# Patient Record
Sex: MALE | Race: WHITE | NOT HISPANIC OR LATINO | Employment: OTHER | URBAN - METROPOLITAN AREA
[De-identification: names, ages, dates, MRNs, and addresses within clinical notes are randomized per-mention and may not be internally consistent; named-entity substitution may affect disease eponyms.]

---

## 2024-04-02 ENCOUNTER — APPOINTMENT (OUTPATIENT)
Dept: RADIOLOGY | Facility: HOSPITAL | Age: 69
DRG: 070 | End: 2024-04-02
Payer: MEDICARE

## 2024-04-02 ENCOUNTER — APPOINTMENT (OUTPATIENT)
Dept: CARDIOLOGY | Facility: HOSPITAL | Age: 69
DRG: 070 | End: 2024-04-02
Payer: MEDICARE

## 2024-04-02 ENCOUNTER — HOSPITAL ENCOUNTER (INPATIENT)
Facility: HOSPITAL | Age: 69
LOS: 4 days | DRG: 070 | End: 2024-04-06
Attending: STUDENT IN AN ORGANIZED HEALTH CARE EDUCATION/TRAINING PROGRAM | Admitting: INTERNAL MEDICINE
Payer: MEDICARE

## 2024-04-02 DIAGNOSIS — R60.0 LOCALIZED EDEMA: ICD-10-CM

## 2024-04-02 DIAGNOSIS — J90 PLEURAL EFFUSION ON LEFT: ICD-10-CM

## 2024-04-02 DIAGNOSIS — R00.1 BRADYCARDIA: ICD-10-CM

## 2024-04-02 DIAGNOSIS — R53.81 DECLINING FUNCTIONAL STATUS: Primary | ICD-10-CM

## 2024-04-02 DIAGNOSIS — R41.82 ALTERED MENTAL STATUS, UNSPECIFIED ALTERED MENTAL STATUS TYPE: ICD-10-CM

## 2024-04-02 DIAGNOSIS — R22.42 LOCALIZED SWELLING, MASS AND LUMP, LEFT LOWER LIMB: ICD-10-CM

## 2024-04-02 DIAGNOSIS — R06.00 DYSPNEA, UNSPECIFIED TYPE: ICD-10-CM

## 2024-04-02 DIAGNOSIS — R60.9 EDEMA, UNSPECIFIED TYPE: ICD-10-CM

## 2024-04-02 DIAGNOSIS — E87.1 HYPONATREMIA: ICD-10-CM

## 2024-04-02 LAB
ALBUMIN SERPL BCP-MCNC: 3 G/DL (ref 3.4–5)
ALP SERPL-CCNC: 79 U/L (ref 33–136)
ALT SERPL W P-5'-P-CCNC: 12 U/L (ref 10–52)
ANION GAP SERPL CALC-SCNC: 11 MMOL/L (ref 10–20)
ANION GAP SERPL CALC-SCNC: 12 MMOL/L (ref 10–20)
ANION GAP SERPL CALC-SCNC: 14 MMOL/L (ref 10–20)
APPEARANCE UR: CLEAR
APTT PPP: 26 SECONDS (ref 27–38)
AST SERPL W P-5'-P-CCNC: 44 U/L (ref 9–39)
BASOPHILS # BLD AUTO: 0.02 X10*3/UL (ref 0–0.1)
BASOPHILS NFR BLD AUTO: 0.2 %
BILIRUB SERPL-MCNC: 0.7 MG/DL (ref 0–1.2)
BILIRUB UR STRIP.AUTO-MCNC: NEGATIVE MG/DL
BNP SERPL-MCNC: 26 PG/ML (ref 0–99)
BUN SERPL-MCNC: 24 MG/DL (ref 6–23)
CALCIUM SERPL-MCNC: 8.4 MG/DL (ref 8.6–10.3)
CALCIUM SERPL-MCNC: 8.5 MG/DL (ref 8.6–10.3)
CALCIUM SERPL-MCNC: 8.9 MG/DL (ref 8.6–10.3)
CARDIAC TROPONIN I PNL SERPL HS: 3 NG/L (ref 0–20)
CHLORIDE SERPL-SCNC: 93 MMOL/L (ref 98–107)
CHLORIDE SERPL-SCNC: 94 MMOL/L (ref 98–107)
CHLORIDE SERPL-SCNC: 94 MMOL/L (ref 98–107)
CO2 SERPL-SCNC: 28 MMOL/L (ref 21–32)
CO2 SERPL-SCNC: 30 MMOL/L (ref 21–32)
CO2 SERPL-SCNC: 30 MMOL/L (ref 21–32)
COLOR UR: ABNORMAL
CREAT SERPL-MCNC: 0.58 MG/DL (ref 0.5–1.3)
CREAT SERPL-MCNC: 0.6 MG/DL (ref 0.5–1.3)
CREAT SERPL-MCNC: 0.6 MG/DL (ref 0.5–1.3)
EGFRCR SERPLBLD CKD-EPI 2021: >90 ML/MIN/1.73M*2
EOSINOPHIL # BLD AUTO: 0.01 X10*3/UL (ref 0–0.4)
EOSINOPHIL NFR BLD AUTO: 0.1 %
ERYTHROCYTE [DISTWIDTH] IN BLOOD BY AUTOMATED COUNT: 13.3 % (ref 11.5–14.5)
GLUCOSE BLD MANUAL STRIP-MCNC: 89 MG/DL (ref 74–99)
GLUCOSE SERPL-MCNC: 97 MG/DL (ref 74–99)
GLUCOSE SERPL-MCNC: 97 MG/DL (ref 74–99)
GLUCOSE SERPL-MCNC: 99 MG/DL (ref 74–99)
GLUCOSE UR STRIP.AUTO-MCNC: NEGATIVE MG/DL
HCT VFR BLD AUTO: 38.7 % (ref 41–52)
HGB BLD-MCNC: 12.8 G/DL (ref 13.5–17.5)
HOLD SPECIMEN: NORMAL
IMM GRANULOCYTES # BLD AUTO: 0.22 X10*3/UL (ref 0–0.5)
IMM GRANULOCYTES NFR BLD AUTO: 2.5 % (ref 0–0.9)
INR PPP: 1.1 (ref 0.9–1.1)
KETONES UR STRIP.AUTO-MCNC: ABNORMAL MG/DL
LEUKOCYTE ESTERASE UR QL STRIP.AUTO: NEGATIVE
LYMPHOCYTES # BLD AUTO: 1.98 X10*3/UL (ref 0.8–3)
LYMPHOCYTES NFR BLD AUTO: 22.7 %
MCH RBC QN AUTO: 32.5 PG (ref 26–34)
MCHC RBC AUTO-ENTMCNC: 33.1 G/DL (ref 32–36)
MCV RBC AUTO: 98 FL (ref 80–100)
MONOCYTES # BLD AUTO: 1.19 X10*3/UL (ref 0.05–0.8)
MONOCYTES NFR BLD AUTO: 13.6 %
NEUTROPHILS # BLD AUTO: 5.31 X10*3/UL (ref 1.6–5.5)
NEUTROPHILS NFR BLD AUTO: 60.9 %
NITRITE UR QL STRIP.AUTO: NEGATIVE
NRBC BLD-RTO: 0 /100 WBCS (ref 0–0)
PH UR STRIP.AUTO: 5 [PH]
PLATELET # BLD AUTO: 151 X10*3/UL (ref 150–450)
POTASSIUM SERPL-SCNC: 3.6 MMOL/L (ref 3.5–5.3)
POTASSIUM SERPL-SCNC: 5.8 MMOL/L (ref 3.5–5.3)
POTASSIUM SERPL-SCNC: 5.9 MMOL/L (ref 3.5–5.3)
PROT SERPL-MCNC: 6.1 G/DL (ref 6.4–8.2)
PROT UR STRIP.AUTO-MCNC: ABNORMAL MG/DL
PROTHROMBIN TIME: 12.9 SECONDS (ref 9.8–12.8)
RBC # BLD AUTO: 3.94 X10*6/UL (ref 4.5–5.9)
RBC # UR STRIP.AUTO: NEGATIVE /UL
RBC #/AREA URNS AUTO: NORMAL /HPF
SODIUM SERPL-SCNC: 128 MMOL/L (ref 136–145)
SODIUM SERPL-SCNC: 130 MMOL/L (ref 136–145)
SODIUM SERPL-SCNC: 132 MMOL/L (ref 136–145)
SP GR UR STRIP.AUTO: 1.03
UROBILINOGEN UR STRIP.AUTO-MCNC: 4 MG/DL
WBC # BLD AUTO: 8.7 X10*3/UL (ref 4.4–11.3)
WBC #/AREA URNS AUTO: NORMAL /HPF

## 2024-04-02 PROCEDURE — 82607 VITAMIN B-12: CPT | Mod: STJLAB

## 2024-04-02 PROCEDURE — 1200000002 HC GENERAL ROOM WITH TELEMETRY DAILY

## 2024-04-02 PROCEDURE — 70450 CT HEAD/BRAIN W/O DYE: CPT | Performed by: RADIOLOGY

## 2024-04-02 PROCEDURE — 80048 BASIC METABOLIC PNL TOTAL CA: CPT | Mod: CCI

## 2024-04-02 PROCEDURE — 93010 ELECTROCARDIOGRAM REPORT: CPT | Performed by: STUDENT IN AN ORGANIZED HEALTH CARE EDUCATION/TRAINING PROGRAM

## 2024-04-02 PROCEDURE — 71045 X-RAY EXAM CHEST 1 VIEW: CPT

## 2024-04-02 PROCEDURE — 92950 HEART/LUNG RESUSCITATION CPR: CPT | Performed by: STUDENT IN AN ORGANIZED HEALTH CARE EDUCATION/TRAINING PROGRAM

## 2024-04-02 PROCEDURE — 84484 ASSAY OF TROPONIN QUANT: CPT | Performed by: STUDENT IN AN ORGANIZED HEALTH CARE EDUCATION/TRAINING PROGRAM

## 2024-04-02 PROCEDURE — 36415 COLL VENOUS BLD VENIPUNCTURE: CPT

## 2024-04-02 PROCEDURE — 82947 ASSAY GLUCOSE BLOOD QUANT: CPT

## 2024-04-02 PROCEDURE — 80053 COMPREHEN METABOLIC PANEL: CPT | Performed by: STUDENT IN AN ORGANIZED HEALTH CARE EDUCATION/TRAINING PROGRAM

## 2024-04-02 PROCEDURE — 81001 URINALYSIS AUTO W/SCOPE: CPT

## 2024-04-02 PROCEDURE — 93005 ELECTROCARDIOGRAM TRACING: CPT

## 2024-04-02 PROCEDURE — 2500000002 HC RX 250 W HCPCS SELF ADMINISTERED DRUGS (ALT 637 FOR MEDICARE OP, ALT 636 FOR OP/ED): Mod: MUE | Performed by: NURSE PRACTITIONER

## 2024-04-02 PROCEDURE — 85730 THROMBOPLASTIN TIME PARTIAL: CPT | Performed by: STUDENT IN AN ORGANIZED HEALTH CARE EDUCATION/TRAINING PROGRAM

## 2024-04-02 PROCEDURE — 99285 EMERGENCY DEPT VISIT HI MDM: CPT | Mod: 25

## 2024-04-02 PROCEDURE — 85610 PROTHROMBIN TIME: CPT | Performed by: STUDENT IN AN ORGANIZED HEALTH CARE EDUCATION/TRAINING PROGRAM

## 2024-04-02 PROCEDURE — 2500000004 HC RX 250 GENERAL PHARMACY W/ HCPCS (ALT 636 FOR OP/ED): Performed by: NURSE PRACTITIONER

## 2024-04-02 PROCEDURE — 36415 COLL VENOUS BLD VENIPUNCTURE: CPT | Performed by: STUDENT IN AN ORGANIZED HEALTH CARE EDUCATION/TRAINING PROGRAM

## 2024-04-02 PROCEDURE — 70450 CT HEAD/BRAIN W/O DYE: CPT

## 2024-04-02 PROCEDURE — 99285 EMERGENCY DEPT VISIT HI MDM: CPT | Performed by: STUDENT IN AN ORGANIZED HEALTH CARE EDUCATION/TRAINING PROGRAM

## 2024-04-02 PROCEDURE — 83880 ASSAY OF NATRIURETIC PEPTIDE: CPT | Performed by: STUDENT IN AN ORGANIZED HEALTH CARE EDUCATION/TRAINING PROGRAM

## 2024-04-02 PROCEDURE — 80048 BASIC METABOLIC PNL TOTAL CA: CPT | Mod: CCI | Performed by: STUDENT IN AN ORGANIZED HEALTH CARE EDUCATION/TRAINING PROGRAM

## 2024-04-02 PROCEDURE — 85025 COMPLETE CBC W/AUTO DIFF WBC: CPT | Performed by: STUDENT IN AN ORGANIZED HEALTH CARE EDUCATION/TRAINING PROGRAM

## 2024-04-02 PROCEDURE — 71045 X-RAY EXAM CHEST 1 VIEW: CPT | Performed by: RADIOLOGY

## 2024-04-02 PROCEDURE — 2500000001 HC RX 250 WO HCPCS SELF ADMINISTERED DRUGS (ALT 637 FOR MEDICARE OP): Performed by: NURSE PRACTITIONER

## 2024-04-02 RX ORDER — LATANOPROST 50 UG/ML
1 SOLUTION/ DROPS OPHTHALMIC NIGHTLY
COMMUNITY

## 2024-04-02 RX ORDER — CHOLECALCIFEROL (VITAMIN D3) 50 MCG
50 TABLET ORAL NIGHTLY
COMMUNITY

## 2024-04-02 RX ORDER — LATANOPROST 50 UG/ML
1 SOLUTION/ DROPS OPHTHALMIC NIGHTLY
Status: DISCONTINUED | OUTPATIENT
Start: 2024-04-02 | End: 2024-04-06 | Stop reason: HOSPADM

## 2024-04-02 RX ORDER — NAPROXEN SODIUM 220 MG/1
81 TABLET, FILM COATED ORAL DAILY
COMMUNITY

## 2024-04-02 RX ORDER — DIVALPROEX SODIUM 250 MG/1
250 TABLET, DELAYED RELEASE ORAL NIGHTLY
COMMUNITY

## 2024-04-02 RX ORDER — TAMSULOSIN HYDROCHLORIDE 0.4 MG/1
0.4 CAPSULE ORAL NIGHTLY
COMMUNITY

## 2024-04-02 RX ORDER — ACETAMINOPHEN 325 MG/1
650 TABLET ORAL EVERY 6 HOURS PRN
Status: DISCONTINUED | OUTPATIENT
Start: 2024-04-02 | End: 2024-04-06 | Stop reason: HOSPADM

## 2024-04-02 RX ORDER — SIMVASTATIN 20 MG/1
20 TABLET, FILM COATED ORAL NIGHTLY
Status: DISCONTINUED | OUTPATIENT
Start: 2024-04-02 | End: 2024-04-06 | Stop reason: HOSPADM

## 2024-04-02 RX ORDER — ACETAMINOPHEN 325 MG/1
650 TABLET ORAL EVERY 6 HOURS PRN
COMMUNITY

## 2024-04-02 RX ORDER — MAGNESIUM HYDROXIDE 2400 MG/10ML
30 SUSPENSION ORAL DAILY PRN
Status: DISCONTINUED | OUTPATIENT
Start: 2024-04-02 | End: 2024-04-06 | Stop reason: HOSPADM

## 2024-04-02 RX ORDER — DIVALPROEX SODIUM 500 MG/1
2000 TABLET, DELAYED RELEASE ORAL NIGHTLY
COMMUNITY

## 2024-04-02 RX ORDER — TALC
3 POWDER (GRAM) TOPICAL NIGHTLY PRN
Status: DISCONTINUED | OUTPATIENT
Start: 2024-04-02 | End: 2024-04-06 | Stop reason: HOSPADM

## 2024-04-02 RX ORDER — GLUCOSAM/CHONDRO/HERB 149/HYAL 750-100 MG
1 TABLET ORAL NIGHTLY
COMMUNITY

## 2024-04-02 RX ORDER — LEVOTHYROXINE SODIUM 50 UG/1
50 TABLET ORAL
COMMUNITY

## 2024-04-02 RX ORDER — DIVALPROEX SODIUM 250 MG/1
250 TABLET, DELAYED RELEASE ORAL NIGHTLY
Status: DISCONTINUED | OUTPATIENT
Start: 2024-04-02 | End: 2024-04-06 | Stop reason: HOSPADM

## 2024-04-02 RX ORDER — SIMVASTATIN 20 MG/1
20 TABLET, FILM COATED ORAL NIGHTLY
COMMUNITY

## 2024-04-02 RX ORDER — DIVALPROEX SODIUM 500 MG/1
2000 TABLET, DELAYED RELEASE ORAL NIGHTLY
Status: DISCONTINUED | OUTPATIENT
Start: 2024-04-02 | End: 2024-04-06 | Stop reason: HOSPADM

## 2024-04-02 RX ORDER — TAMSULOSIN HYDROCHLORIDE 0.4 MG/1
0.4 CAPSULE ORAL NIGHTLY
Status: DISCONTINUED | OUTPATIENT
Start: 2024-04-02 | End: 2024-04-06 | Stop reason: HOSPADM

## 2024-04-02 RX ORDER — POLYETHYLENE GLYCOL 3350 17 G/17G
17 POWDER, FOR SOLUTION ORAL DAILY PRN
Status: DISCONTINUED | OUTPATIENT
Start: 2024-04-02 | End: 2024-04-06 | Stop reason: HOSPADM

## 2024-04-02 RX ORDER — ADHESIVE BANDAGE
30 BANDAGE TOPICAL DAILY PRN
COMMUNITY

## 2024-04-02 RX ORDER — KETOCONAZOLE 20 MG/G
1 CREAM TOPICAL 2 TIMES DAILY PRN
COMMUNITY

## 2024-04-02 RX ORDER — ENOXAPARIN SODIUM 100 MG/ML
40 INJECTION SUBCUTANEOUS DAILY
Status: DISCONTINUED | OUTPATIENT
Start: 2024-04-02 | End: 2024-04-06 | Stop reason: HOSPADM

## 2024-04-02 RX ORDER — LEVOTHYROXINE SODIUM 50 UG/1
50 TABLET ORAL
Status: DISCONTINUED | OUTPATIENT
Start: 2024-04-03 | End: 2024-04-06 | Stop reason: HOSPADM

## 2024-04-02 RX ORDER — NAPROXEN SODIUM 220 MG/1
81 TABLET, FILM COATED ORAL DAILY
Status: DISCONTINUED | OUTPATIENT
Start: 2024-04-02 | End: 2024-04-06 | Stop reason: HOSPADM

## 2024-04-02 RX ADMIN — DIVALPROEX SODIUM 250 MG: 500 TABLET, DELAYED RELEASE ORAL at 20:44

## 2024-04-02 RX ADMIN — ASPIRIN 81 MG 81 MG: 81 TABLET ORAL at 20:48

## 2024-04-02 RX ADMIN — SIMVASTATIN 20 MG: 20 TABLET, FILM COATED ORAL at 20:45

## 2024-04-02 RX ADMIN — TAMSULOSIN HYDROCHLORIDE 0.4 MG: 0.4 CAPSULE ORAL at 20:45

## 2024-04-02 RX ADMIN — LATANOPROST 1 DROP: 50 SOLUTION OPHTHALMIC at 20:48

## 2024-04-02 RX ADMIN — ENOXAPARIN SODIUM 40 MG: 40 INJECTION SUBCUTANEOUS at 17:54

## 2024-04-02 RX ADMIN — DIVALPROEX SODIUM 2000 MG: 500 TABLET, DELAYED RELEASE ORAL at 21:00

## 2024-04-02 SDOH — HEALTH STABILITY: MENTAL HEALTH: HOW OFTEN DO YOU HAVE 6 OR MORE DRINKS ON ONE OCCASION?: NEVER

## 2024-04-02 SDOH — SOCIAL STABILITY: SOCIAL INSECURITY: WERE YOU ABLE TO COMPLETE ALL THE BEHAVIORAL HEALTH SCREENINGS?: YES

## 2024-04-02 SDOH — HEALTH STABILITY: MENTAL HEALTH: HOW OFTEN DO YOU HAVE A DRINK CONTAINING ALCOHOL?: NEVER

## 2024-04-02 SDOH — SOCIAL STABILITY: SOCIAL INSECURITY: ARE THERE ANY APPARENT SIGNS OF INJURIES/BEHAVIORS THAT COULD BE RELATED TO ABUSE/NEGLECT?: UNABLE TO ASSESS

## 2024-04-02 SDOH — SOCIAL STABILITY: SOCIAL INSECURITY: HAS ANYONE EVER THREATENED TO HURT YOUR FAMILY OR YOUR PETS?: UNABLE TO ASSESS

## 2024-04-02 SDOH — SOCIAL STABILITY: SOCIAL INSECURITY: ABUSE: ADULT

## 2024-04-02 SDOH — HEALTH STABILITY: MENTAL HEALTH: HOW MANY STANDARD DRINKS CONTAINING ALCOHOL DO YOU HAVE ON A TYPICAL DAY?: PATIENT DOES NOT DRINK

## 2024-04-02 SDOH — SOCIAL STABILITY: SOCIAL INSECURITY: ARE YOU OR HAVE YOU BEEN THREATENED OR ABUSED PHYSICALLY, EMOTIONALLY, OR SEXUALLY BY ANYONE?: UNABLE TO ASSESS

## 2024-04-02 SDOH — SOCIAL STABILITY: SOCIAL INSECURITY: DO YOU FEEL UNSAFE GOING BACK TO THE PLACE WHERE YOU ARE LIVING?: UNABLE TO ASSESS

## 2024-04-02 SDOH — SOCIAL STABILITY: SOCIAL INSECURITY: DOES ANYONE TRY TO KEEP YOU FROM HAVING/CONTACTING OTHER FRIENDS OR DOING THINGS OUTSIDE YOUR HOME?: UNABLE TO ASSESS

## 2024-04-02 SDOH — SOCIAL STABILITY: SOCIAL INSECURITY: DO YOU FEEL ANYONE HAS EXPLOITED OR TAKEN ADVANTAGE OF YOU FINANCIALLY OR OF YOUR PERSONAL PROPERTY?: UNABLE TO ASSESS

## 2024-04-02 SDOH — SOCIAL STABILITY: SOCIAL INSECURITY: HAVE YOU HAD THOUGHTS OF HARMING ANYONE ELSE?: NO

## 2024-04-02 ASSESSMENT — COGNITIVE AND FUNCTIONAL STATUS - GENERAL
PERSONAL GROOMING: A LITTLE
MOVING TO AND FROM BED TO CHAIR: A LOT
CLIMB 3 TO 5 STEPS WITH RAILING: TOTAL
DAILY ACTIVITIY SCORE: 14
DRESSING REGULAR LOWER BODY CLOTHING: A LOT
STANDING UP FROM CHAIR USING ARMS: A LOT
CLIMB 3 TO 5 STEPS WITH RAILING: TOTAL
TOILETING: A LOT
HELP NEEDED FOR BATHING: A LOT
HELP NEEDED FOR BATHING: A LOT
WALKING IN HOSPITAL ROOM: TOTAL
MOVING FROM LYING ON BACK TO SITTING ON SIDE OF FLAT BED WITH BEDRAILS: A LOT
EATING MEALS: A LITTLE
DRESSING REGULAR LOWER BODY CLOTHING: A LOT
PERSONAL GROOMING: A LOT
DRESSING REGULAR UPPER BODY CLOTHING: A LOT
DRESSING REGULAR UPPER BODY CLOTHING: A LOT
MOVING FROM LYING ON BACK TO SITTING ON SIDE OF FLAT BED WITH BEDRAILS: A LOT
STANDING UP FROM CHAIR USING ARMS: A LOT
PATIENT BASELINE BEDBOUND: UNABLE TO ASSESS AT THIS TIME
MOBILITY SCORE: 10
TURNING FROM BACK TO SIDE WHILE IN FLAT BAD: A LOT
DAILY ACTIVITIY SCORE: 12
WALKING IN HOSPITAL ROOM: TOTAL
MOBILITY SCORE: 10
TURNING FROM BACK TO SIDE WHILE IN FLAT BAD: A LOT
TOILETING: A LOT
MOVING TO AND FROM BED TO CHAIR: A LOT
EATING MEALS: A LOT

## 2024-04-02 ASSESSMENT — ACTIVITIES OF DAILY LIVING (ADL)
FEEDING YOURSELF: NEEDS ASSISTANCE
DRESSING YOURSELF: DEPENDENT
GROOMING: DEPENDENT
WALKS IN HOME: DEPENDENT
HEARING - LEFT EAR: FUNCTIONAL
HEARING - RIGHT EAR: FUNCTIONAL
ADEQUATE_TO_COMPLETE_ADL: NO
TOILETING: DEPENDENT
LACK_OF_TRANSPORTATION: PATIENT UNABLE TO ANSWER
BATHING: DEPENDENT
PATIENT'S MEMORY ADEQUATE TO SAFELY COMPLETE DAILY ACTIVITIES?: NO
JUDGMENT_ADEQUATE_SAFELY_COMPLETE_DAILY_ACTIVITIES: NO

## 2024-04-02 ASSESSMENT — PATIENT HEALTH QUESTIONNAIRE - PHQ9
2. FEELING DOWN, DEPRESSED OR HOPELESS: NOT AT ALL
1. LITTLE INTEREST OR PLEASURE IN DOING THINGS: NOT AT ALL
SUM OF ALL RESPONSES TO PHQ9 QUESTIONS 1 & 2: 0

## 2024-04-02 ASSESSMENT — LIFESTYLE VARIABLES
HOW MANY STANDARD DRINKS CONTAINING ALCOHOL DO YOU HAVE ON A TYPICAL DAY: PATIENT DOES NOT DRINK
HAVE PEOPLE ANNOYED YOU BY CRITICIZING YOUR DRINKING: NO
TOTAL SCORE: 0
HOW OFTEN DO YOU HAVE A DRINK CONTAINING ALCOHOL: NEVER
EVER HAD A DRINK FIRST THING IN THE MORNING TO STEADY YOUR NERVES TO GET RID OF A HANGOVER: NO
AUDIT-C TOTAL SCORE: 0
AUDIT-C TOTAL SCORE: 0
SKIP TO QUESTIONS 9-10: 1
HOW OFTEN DO YOU HAVE 6 OR MORE DRINKS ON ONE OCCASION: NEVER
HAVE YOU EVER FELT YOU SHOULD CUT DOWN ON YOUR DRINKING: NO
SKIP TO QUESTIONS 9-10: 1
AUDIT-C TOTAL SCORE: 0
AUDIT-C TOTAL SCORE: 0
EVER FELT BAD OR GUILTY ABOUT YOUR DRINKING: NO
SKIP TO QUESTIONS 9-10: 1

## 2024-04-02 ASSESSMENT — COLUMBIA-SUICIDE SEVERITY RATING SCALE - C-SSRS
6. HAVE YOU EVER DONE ANYTHING, STARTED TO DO ANYTHING, OR PREPARED TO DO ANYTHING TO END YOUR LIFE?: NO
1. IN THE PAST MONTH, HAVE YOU WISHED YOU WERE DEAD OR WISHED YOU COULD GO TO SLEEP AND NOT WAKE UP?: NO
2. HAVE YOU ACTUALLY HAD ANY THOUGHTS OF KILLING YOURSELF?: NO

## 2024-04-02 ASSESSMENT — PAIN - FUNCTIONAL ASSESSMENT: PAIN_FUNCTIONAL_ASSESSMENT: 0-10

## 2024-04-02 ASSESSMENT — PAIN SCALES - GENERAL
PAINLEVEL_OUTOF10: 0 - NO PAIN
PAINLEVEL_OUTOF10: 0 - NO PAIN

## 2024-04-02 NOTE — ED PROVIDER NOTES
EMERGENCY DEPARTMENT ENCOUNTER      Pt Name: Dimas Ann  MRN: [id number]  Birthdate 1955  Date of evaluation: 4/2/2024  Provider: Julian Finch DO    CHIEF COMPLAINT       Chief Complaint   Patient presents with    Altered Mental Status         HISTORY OF PRESENT ILLNESS    HPI  Patient is a 69-year-old male with history of CVA with right-sided hemiparesis presenting with possible altered mental status.  This has been ongoing for 3 days.  His nursing home was unable to tell EMS what his baseline was and knew very little information about him; stating that he hadn't lived there very long.  At presentation, patient is minimally conversive, A&O x 0, following commands on his left side only.    Nursing Notes were reviewed.    PAST MEDICAL HISTORY     Past Medical History:   Diagnosis Date    Aphasia due to acute stroke (CMS/HCC)     Cellulitis     Hemiplegia (CMS/HCC)     Stroke (CMS/HCC)          SURGICAL HISTORY     History reviewed. No pertinent surgical history.      CURRENT MEDICATIONS       Current Discharge Medication List        CONTINUE these medications which have NOT CHANGED    Details   acetaminophen (Tylenol) 325 mg tablet Take 2 tablets (650 mg) by mouth every 6 hours if needed for mild pain (1 - 3) or fever (temp greater than 38.0 C).      aspirin 81 mg chewable tablet Chew 1 tablet (81 mg) once daily.      cholecalciferol (Vitamin D-3) 50 MCG (2000 UT) tablet Take 1 tablet (50 mcg) by mouth once daily at bedtime.      !! divalproex (Depakote) 250 mg EC tablet Take 1 tablet (250 mg) by mouth once daily at bedtime. Total of 2,250mg      !! divalproex (Depakote) 500 mg EC tablet Take 4 tablets (2,000 mg) by mouth once daily at bedtime. Total of 2,250mg      ketoconazole (NIZOral) 2 % cream Apply 1 Application topically 2 times a day as needed for itching or rash.      latanoprost (Xalatan) 0.005 % ophthalmic solution Administer 1 drop into both eyes once daily at bedtime.      levothyroxine  (Synthroid, Levoxyl) 50 mcg tablet Take 1 tablet (50 mcg) by mouth once daily in the morning. Take before meals.      magnesium hydroxide (Milk of Magnesia) 400 mg/5 mL suspension Take 30 mL by mouth once daily as needed for constipation.      omega 3-dha-epa-fish oil (Fish OiL) 1,000 mg (120 mg-180 mg) capsule Take 1 capsule (1,000 mg) by mouth once daily at bedtime.      simvastatin (Zocor) 20 mg tablet Take 1 tablet (20 mg) by mouth once daily at bedtime.      tamsulosin (Flomax) 0.4 mg 24 hr capsule Take 1 capsule (0.4 mg) by mouth once daily at bedtime.       !! - Potential duplicate medications found. Please discuss with provider.          ALLERGIES     Patient has no known allergies.    FAMILY HISTORY     No family history on file.       SOCIAL HISTORY       Social History     Socioeconomic History    Marital status: Single     Spouse name: None    Number of children: None    Years of education: None    Highest education level: None   Occupational History    None   Tobacco Use    Smoking status: Unknown    Smokeless tobacco: None   Substance and Sexual Activity    Alcohol use: Not Currently    Drug use: Defer    Sexual activity: None   Other Topics Concern    None   Social History Narrative    None     Social Determinants of Health     Financial Resource Strain: Patient Unable To Answer (4/2/2024)    Overall Financial Resource Strain (CARDIA)     Difficulty of Paying Living Expenses: Patient unable to answer   Food Insecurity: Not on file   Transportation Needs: Patient Unable To Answer (4/2/2024)    PRAPARE - Transportation     Lack of Transportation (Medical): Patient unable to answer     Lack of Transportation (Non-Medical): Patient unable to answer   Physical Activity: Not on file   Stress: Not on file   Social Connections: Not on file   Intimate Partner Violence: Not on file   Housing Stability: Patient Unable To Answer (4/2/2024)    Housing Stability Vital Sign     Unable to Pay for Housing in the Last  Year: Patient unable to answer     Number of Places Lived in the Last Year: 1     Unstable Housing in the Last Year: Patient unable to answer       SCREENINGS                        PHYSICAL EXAM    (up to 7 for level 4, 8 or more for level 5)     ED Triage Vitals [04/02/24 0833]   Temperature Heart Rate Respirations BP   36.2 °C (97.2 °F) 85 20 127/70      Pulse Ox Temp Source Heart Rate Source Patient Position   94 % Temporal Monitor --      BP Location FiO2 (%)     Right arm --       Physical Exam  Vitals and nursing note reviewed.   Constitutional:       General: He is not in acute distress.     Appearance: He is not toxic-appearing.   HENT:      Head: Normocephalic and atraumatic.   Eyes:      General: No scleral icterus.     Extraocular Movements: Extraocular movements intact.      Pupils: Pupils are equal, round, and reactive to light.   Cardiovascular:      Rate and Rhythm: Normal rate and regular rhythm.      Heart sounds: Normal heart sounds.   Pulmonary:      Effort: Pulmonary effort is normal. No respiratory distress.      Breath sounds: Normal breath sounds.   Abdominal:      General: There is no distension.      Palpations: Abdomen is soft.      Tenderness: There is no abdominal tenderness.   Musculoskeletal:         General: No tenderness. Normal range of motion.      Cervical back: Normal range of motion and neck supple.   Skin:     General: Skin is warm and dry.      Comments: Chronic skin changes to the bilateral lower extremity to the knee   Neurological:      Comments: Alert and oriented x 0, following commands with the left-sided extremities only.  Fails to further participate in neurologic exam due to mentation.          DIAGNOSTIC RESULTS     LABS:  Labs Reviewed   CBC WITH AUTO DIFFERENTIAL - Abnormal       Result Value    WBC 8.7      nRBC 0.0      RBC 3.94 (*)     Hemoglobin 12.8 (*)     Hematocrit 38.7 (*)     MCV 98      MCH 32.5      MCHC 33.1      RDW 13.3      Platelets 151       Neutrophils % 60.9      Immature Granulocytes %, Automated 2.5 (*)     Lymphocytes % 22.7      Monocytes % 13.6      Eosinophils % 0.1      Basophils % 0.2      Neutrophils Absolute 5.31      Immature Granulocytes Absolute, Automated 0.22      Lymphocytes Absolute 1.98      Monocytes Absolute 1.19 (*)     Eosinophils Absolute 0.01      Basophils Absolute 0.02     COMPREHENSIVE METABOLIC PANEL - Abnormal    Glucose 97      Sodium 128 (*)     Potassium 5.8 (*)     Chloride 93 (*)     Bicarbonate 30      Anion Gap 11      Urea Nitrogen 24 (*)     Creatinine 0.60      eGFR >90      Calcium 8.4 (*)     Albumin 3.0 (*)     Alkaline Phosphatase 79      Total Protein 6.1 (*)     AST 44 (*)     Bilirubin, Total 0.7      ALT 12     PROTIME-INR - Abnormal    Protime 12.9 (*)     INR 1.1     APTT - Abnormal    aPTT 26 (*)     Narrative:     The APTT is no longer used for monitoring Unfractionated Heparin Therapy. For monitoring Heparin Therapy, use the Heparin Assay.   URINALYSIS WITH REFLEX CULTURE AND MICROSCOPIC - Abnormal    Color, Urine Karlene (*)     Appearance, Urine Clear      Specific Gravity, Urine 1.033      pH, Urine 5.0      Protein, Urine 100 (2+) (*)     Glucose, Urine NEGATIVE      Blood, Urine NEGATIVE      Ketones, Urine 5 (TRACE) (*)     Bilirubin, Urine NEGATIVE      Urobilinogen, Urine 4.0 (*)     Nitrite, Urine NEGATIVE      Leukocyte Esterase, Urine NEGATIVE     BASIC METABOLIC PANEL - Abnormal    Glucose 97      Sodium 130 (*)     Potassium 5.9 (*)     Chloride 94 (*)     Bicarbonate 28      Anion Gap 14      Urea Nitrogen 24 (*)     Creatinine 0.58      eGFR >90      Calcium 8.5 (*)    BASIC METABOLIC PANEL - Abnormal    Glucose 99      Sodium 132 (*)     Potassium 3.6      Chloride 94 (*)     Bicarbonate 30      Anion Gap 12      Urea Nitrogen 24 (*)     Creatinine 0.60      eGFR >90      Calcium 8.9     TROPONIN I, HIGH SENSITIVITY - Normal    Troponin I, High Sensitivity 3      Narrative:     Less  than 99th percentile of normal range cutoff-  Female and children under 18 years old <14 ng/L; Male <21 ng/L: Negative  Repeat testing should be performed if clinically indicated.     Female and children under 18 years old 14-50 ng/L; Male 21-50 ng/L:  Consistent with possible cardiac damage and possible increased clinical   risk. Serial measurements may help to assess extent of myocardial damage.     >50 ng/L: Consistent with cardiac damage, increased clinical risk and  myocardial infarction. Serial measurements may help assess extent of   myocardial damage.      NOTE: Children less than 1 year old may have higher baseline troponin   levels and results should be interpreted in conjunction with the overall   clinical context.     NOTE: Troponin I testing is performed using a different   testing methodology at [facility] than at other   [facility]. Direct result comparisons should only   be made within the same method.   B-TYPE NATRIURETIC PEPTIDE - Normal    BNP 26      Narrative:        <100 pg/mL - Heart failure unlikely  100-299 pg/mL - Intermediate probability of acute heart                  failure exacerbation. Correlate with clinical                  context and patient history.    >=300 pg/mL - Heart Failure likely. Correlate with clinical                  context and patient history.    BNP testing is performed using different testing methodology at [facility] than at other [facility]. Direct result comparisons should only be made within the same method.      POCT GLUCOSE - Normal    POCT Glucose 89     URINALYSIS MICROSCOPIC WITH REFLEX CULTURE - Normal    WBC, Urine 1-5      RBC, Urine 3-5     URINALYSIS WITH REFLEX CULTURE AND MICROSCOPIC    Narrative:     The following orders were created for panel order Urinalysis with Reflex Culture and Microscopic.  Procedure                               Abnormality         Status                     ---------                                -----------         ------                     Urinalysis with Reflex C...[[id number]]  Abnormal            Final result               Extra Urine Gray Tube[[id number]]                            Final result                 Please view results for these tests on the individual orders.   EXTRA URINE GRAY TUBE    Extra Tube Hold for add-ons.     CBC   BASIC METABOLIC PANEL   PHOSPHORUS   MAGNESIUM   HEPATIC FUNCTION PANEL   AMMONIA   POCT GLUCOSE METER       All other labs were within normal range or not returned as of this dictation.    Imaging  XR chest 1 view   Final Result   Findings consistent with ongoing CH F.        Moderate-sized left pleural effusion with associated left basilar   atelectasis.        MACRO:   None        Signed by: Jayce Landeros 4/2/2024 10:29 AM   Dictation workstation:   [id number]      CT head wo IV contrast   Final Result   No acute intracranial bleed or focal mass effect.        Changes from large old infarct in the left MCA distribution.        Moderate volume loss.        Mild chronic white matter ischemic disease in the deep   periventricular regions.        High left paramidline parietal scalp calcified nodule. Old calcified   hematoma versus calcified sebaceous cyst.        MACRO:   None        Signed by: Jayce Landeros 4/2/2024 9:38 AM   Dictation workstation:   [id number]           Procedures  Procedures     EMERGENCY DEPARTMENT COURSE/MDM:     ED Course as of 04/03/24 0040   Tue Apr 02, 2024   0847 EKG independently interpreted by attending physician    0841 hrs.: Normal sinus rhythm ventricular rate of 87 bpm.  QTc 421.  QRS 92.  Nonspecific T wave abnormality.  No acute injury pattern seen. [AI]      ED Course User Index  [AI] Julian Finch DO         Diagnoses as of 04/03/24 0040   Declining functional status   Hyponatremia   Altered mental status, unspecified altered mental status type        Medical Decision Making  History obtained from EMS and later family.   Records including labs, imaging, notes reviewed.  Presentation, patient's baseline was unknown.  Patient was reportedly only at this particular assisted care facility for short period of time and no one was too familiar with him on staff.  The being said, patient has had similar neurologic symptoms for several days; a brain attack was not called.  Cardiac labs, urinalysis, and head CT were then ordered to evaluate for possible causes of altered mental status, generalized weakness.  CMP demonstrated mildly elevated BUN of 24, mild hyponatremia of 128, hemolyzed potassium of 5.8.  Repeat BMP was also hemolyzed.  A third BMP was ordered which demonstrated normal potassium of 3.6.  Hematology notable for minor anemia of 12.8.  We had no values to compare this to his [place].  Urinalysis with no evidence of UTI or other acute pathology.  Chest x-ray unremarkable.  Troponin and BNP within normal limits.  Head CT without evidence of acute findings.  After his workup was complete, patient's brother and sister-in-law had arrived.  It was found out that he began to become altered around October 2023 when he was living down [facility] and his brother moved him to [place].  He was still using his walker on his own during that time however had a decline in February 2024.  Since that time, patient has had needed more assistance with daily activities.  He is currently in an assisted living facility.       He was admitted to this assisted care facility via a telemedicine evaluation however at this time believe that he would benefit from skilled nursing facility.  He was requiring 3 person assist for transfers which was not appropriate for assisted care.  This was discussed with family and given these concerns, decision made to connect with social work and attempt to place patient if possible.  Attending physician spoke to Lynn and she stated that patient would need precert.  Given his  generalized weakness, hyponatremia, altered mental status, case was discussed with admitting doctor for admission.      EKG demonstrated normal sinus rhythm at a rate of 87, QTc of 421.  No acute injury pattern.    Patient and or family in agreement and understanding of treatment plan.  All questions answered.      I reviewed the case with the attending ED physician. The attending ED physician agrees with the plan. Patient and/or patient´s representative was counseled regarding labs, imaging, likely diagnosis, and plan. All questions were answered.    ED Medications administered this visit:    Medications   enoxaparin (Lovenox) syringe 40 mg (40 mg subcutaneous Given 4/2/24 1754)   melatonin tablet 3 mg (has no administration in time range)   polyethylene glycol (Glycolax, Miralax) packet 17 g (has no administration in time range)   acetaminophen (Tylenol) tablet 650 mg (has no administration in time range)   aspirin chewable tablet 81 mg (81 mg oral Given 4/2/24 2048)   divalproex (Depakote) EC tablet 250 mg (250 mg oral Given 4/2/24 2044)   divalproex (Depakote) EC tablet 2,000 mg (2,000 mg oral Given 4/2/24 2100)   latanoprost (Xalatan) 0.005 % ophthalmic solution 1 drop (1 drop Both Eyes Given 4/2/24 2048)   levothyroxine (Synthroid, Levoxyl) tablet 50 mcg (has no administration in time range)   magnesium hydroxide (Milk of Magnesia) 2,400 mg/10 mL suspension 30 mL (has no administration in time range)   simvastatin (Zocor) tablet 20 mg (20 mg oral Given 4/2/24 2045)   tamsulosin (Flomax) 24 hr capsule 0.4 mg (0.4 mg oral Given 4/2/24 2045)       New Prescriptions from this visit:    Current Discharge Medication List          Follow-up:  Avila Weller MD  [address]  [place] [address]  [phone]              Final Impression:   1. Declining functional status    2. Hyponatremia    3. Altered mental status, unspecified altered mental status type          (Please note that portions  of this note were completed with a voice recognition program.  Efforts were made to edit the dictations but occasionally words are mis-transcribed.)     Luis Coelho MD  Resident  04/02/24 0318    The patient was seen by the resident/fellow.  I have personally performed a substantive portion of the encounter.  I have seen and examined the patient; agree with the workup, evaluation, MDM, management and diagnosis.  The care plan has been discussed with the resident; I have reviewed the resident’s note and agree with the documented findings.           Julian Finch DO  04/03/24 0040

## 2024-04-02 NOTE — CARE PLAN
The clinical goals for the shift include remain free from falls this shift      Problem: Pain  Goal: My pain/discomfort is manageable  Outcome: Progressing  Flowsheets (Taken 4/2/2024 1838)  Resident's pain/discomfort is manageable:   Include resident/family/caregiver in decisions related to pain management   Offer non-pharmacological pain management interventions   Administer pain medication prior to activities that may trigger pain   Identify and avoid pain triggers     Problem: Safety  Goal: Patient will be injury free during hospitalization  Outcome: Progressing  Goal: I will remain free of falls  Outcome: Progressing  Flowsheets (Taken 4/2/2024 1838)  Resident will remain free of falls:   Use gait belt for all transfers   Apply bed/chair alarms as appropriate   Accompany resident as ordered (ex. 1:1, stand-by assist, dayroom monitoring, 15 minute checks, line of sight)   Assist with toileting as orderd   Visual checks per facility policy   Maintain bed at position as ordered (chair height, low bed)   Consult with physical therapy as needed   Assess and monitor medications that may increase fall risk   Consider transfer to room close to nurses' station     Problem: Daily Care  Goal: Daily care needs are met  Outcome: Progressing  Flowsheets (Taken 4/2/2024 1838)  Daily care needs are met:   Assess and monitor ability to perform self care and identify potential discharge needs   Assess skin integrity/risk for skin breakdown   Assist patient with activities of daily living as needed   Encourage independent activity per ability   Provide mouth care   Include patient/family/caregiver in decisions related to daily care     Problem: Psychosocial Needs  Goal: Demonstrates ability to cope with hospitalization/illness  Outcome: Progressing  Flowsheets (Taken 4/2/2024 1838)  Demonstrates ability to cope with hospitalization/illness:   Encourage verbalization of feelings/concerns/expectations   Provide low-stimulation  environment as needed   Assist resident to identify and practice own strengths and abilities   Encourage resident to set and complete small goals for self   Encourage participation in diversional activities   Reinforce positive adaptation of new coping behaviors   Include resident/family/caregiver in decisions related to psychosocial needs  Goal: Collaborate with me, my family, and caregiver to identify my specific goals  Outcome: Progressing     Problem: Discharge Barriers  Goal: My discharge needs are met  Outcome: Progressing  Flowsheets (Taken 4/2/2024 1838)  Resident's discharge needs are met:   Identify potential discharge barriers on admission and throughout stay   Involve resident/family/caregiver in discharge planning process     Problem: Skin  Goal: Participates in plan/prevention/treatment measures  Outcome: Progressing  Flowsheets (Taken 4/2/2024 1838)  Participates in plan/prevention/treatment measures:   Discuss with provider PT/OT consult   Elevate heels   Increase activity/out of bed for meals  Goal: Prevent/manage excess moisture  Outcome: Progressing  Flowsheets (Taken 4/2/2024 1838)  Prevent/manage excess moisture:   Cleanse incontinence/protect with barrier cream   Follow provider orders for dressing changes   Monitor for/manage infection if present   Moisturize dry skin  Goal: Prevent/minimize sheer/friction injuries  Outcome: Progressing  Flowsheets (Taken 4/2/2024 1838)  Prevent/minimize sheer/friction injuries:   HOB 30 degrees or less   Increase activity/out of bed for meals   Turn/reposition every 2 hours/use positioning/transfer devices   Use pull sheet  Goal: Promote/optimize nutrition  Outcome: Progressing  Flowsheets (Taken 4/2/2024 1838)  Promote/optimize nutrition:   Assist with feeding   Consume > 50% meals/supplements   Monitor/record intake including meals   Offer water/supplements/favorite foods  Goal: Promote skin healing  Outcome: Progressing  Flowsheets (Taken 4/2/2024  1838)  Promote skin healing:   Assess skin/pad under line(s)/device(s)   Protective dressings over bony prominences   Turn/reposition every 2 hours/use positioning/transfer devices     Problem: Fall/Injury  Goal: Not fall by end of shift  Outcome: Progressing  Goal: Be free from injury by end of the shift  Outcome: Progressing  Goal: Verbalize understanding of personal risk factors for fall in the hospital  Outcome: Progressing  Goal: Verbalize understanding of risk factor reduction measures to prevent injury from fall in the home  Outcome: Progressing  Goal: Use assistive devices by end of the shift  Outcome: Progressing  Goal: Pace activities to prevent fatigue by end of the shift  Outcome: Progressing     Problem: Pain - Adult  Goal: Verbalizes/displays adequate comfort level or baseline comfort level  Outcome: Progressing  Flowsheets (Taken 4/2/2024 1838)  Verbalizes/displays adequate comfort level or baseline comfort level:   Encourage patient to monitor pain and request assistance   Assess pain using appropriate pain scale   Administer analgesics based on type and severity of pain and evaluate response   Implement non-pharmacological measures as appropriate and evaluate response   Notify Licensed Independent Practitioner if interventions unsuccessful or patient reports new pain     Problem: Safety - Adult  Goal: Free from fall injury  Outcome: Progressing  Flowsheets (Taken 4/2/2024 1838)  Free from fall injury:   Instruct family/caregiver on patient safety   Based on caregiver fall risk screen, instruct family/caregiver to ask for assistance with transferring infant if caregiver noted to have fall risk factors     Problem: Discharge Planning  Goal: Discharge to home or other facility with appropriate resources  Outcome: Progressing  Flowsheets (Taken 4/2/2024 1838)  Discharge to home or other facility with appropriate resources:   Identify barriers to discharge with patient and caregiver   Arrange for needed  discharge resources and transportation as appropriate   Identify discharge learning needs (meds, wound care, etc)   Refer to discharge planning if patient needs post-hospital services based on physician order or complex needs related to functional status, cognitive ability or social support system     Problem: Chronic Conditions and Co-morbidities  Goal: Patient's chronic conditions and co-morbidity symptoms are monitored and maintained or improved  Outcome: Progressing  Flowsheets (Taken 4/2/2024 5391)  Care Plan - Patient's Chronic Conditions and Co-Morbidity Symptoms are Monitored and Maintained or Improved:   Monitor and assess patient's chronic conditions and comorbid symptoms for stability, deterioration, or improvement   Collaborate with multidisciplinary team to address chronic and comorbid conditions and prevent exacerbation or deterioration   Update acute care plan with appropriate goals if chronic or comorbid symptoms are exacerbated and prevent overall improvement and discharge

## 2024-04-02 NOTE — ED NOTES
This nurse attempted to call report, nurse Nancy refused report stated pt is not to return to facility. This nurse reported to charge nurse.      Lisandra Goldberg RN  04/02/24 7126

## 2024-04-02 NOTE — ED NOTES
1520 I tried calling [facility] to [facility] that the [facility] is here and being admitted no answer     Renetta De La Garza, EMT  04/02/24 1524

## 2024-04-02 NOTE — DISCHARGE INSTRUCTIONS
Dimas was evaluated for concerns of fatigue.  Based on our discussion, this seems like a more progressive rather than an acute decline.  Given that he has evidence that there is fluid buildup in his lungs, but no need for oxygen to require admission to the hospital, we recommend that you establish with cardiology for further evaluation and treatment as indicated.  If Dimas falls, becomes increasingly less responsive, or develops other worrisome symptoms, return to the emergency department.

## 2024-04-03 ENCOUNTER — APPOINTMENT (OUTPATIENT)
Dept: CARDIOLOGY | Facility: HOSPITAL | Age: 69
DRG: 070 | End: 2024-04-03
Payer: MEDICARE

## 2024-04-03 ENCOUNTER — APPOINTMENT (OUTPATIENT)
Dept: RADIOLOGY | Facility: HOSPITAL | Age: 69
DRG: 070 | End: 2024-04-03
Payer: MEDICARE

## 2024-04-03 PROBLEM — I69.30 HISTORY OF STROKE WITH RESIDUAL DEFICIT: Status: ACTIVE | Noted: 2022-02-10

## 2024-04-03 PROBLEM — R74.01 ELEVATED AST (SGOT): Status: ACTIVE | Noted: 2024-04-03

## 2024-04-03 PROBLEM — E03.9 HYPOTHYROIDISM: Status: ACTIVE | Noted: 2024-04-03

## 2024-04-03 PROBLEM — J90 PLEURAL EFFUSION ON LEFT: Status: ACTIVE | Noted: 2024-04-03

## 2024-04-03 PROBLEM — D69.6 THROMBOCYTOPENIA (CMS-HCC): Status: ACTIVE | Noted: 2022-02-10

## 2024-04-03 PROBLEM — R00.1 BRADYCARDIA: Status: ACTIVE | Noted: 2022-02-10

## 2024-04-03 PROBLEM — M62.82 NON-TRAUMATIC RHABDOMYOLYSIS: Status: ACTIVE | Noted: 2022-02-10

## 2024-04-03 PROBLEM — E87.1 HYPONATREMIA: Status: ACTIVE | Noted: 2024-04-03

## 2024-04-03 PROBLEM — E44.1 MILD PROTEIN-CALORIE MALNUTRITION (MULTI): Status: ACTIVE | Noted: 2022-02-10

## 2024-04-03 PROBLEM — I10 HYPERTENSION: Status: ACTIVE | Noted: 2024-04-03

## 2024-04-03 PROBLEM — T68.XXXA HYPOTHERMIA DUE TO EXPOSURE: Status: ACTIVE | Noted: 2022-02-10

## 2024-04-03 PROBLEM — R53.1 GENERALIZED WEAKNESS: Status: ACTIVE | Noted: 2024-04-03

## 2024-04-03 LAB
ALBUMIN SERPL BCP-MCNC: 2.9 G/DL (ref 3.4–5)
ALP SERPL-CCNC: 75 U/L (ref 33–136)
ALT SERPL W P-5'-P-CCNC: 9 U/L (ref 10–52)
AMMONIA PLAS-SCNC: 34 UMOL/L (ref 16–53)
ANION GAP SERPL CALC-SCNC: 14 MMOL/L (ref 10–20)
AST SERPL W P-5'-P-CCNC: 17 U/L (ref 9–39)
BILIRUB DIRECT SERPL-MCNC: 0.2 MG/DL (ref 0–0.3)
BILIRUB SERPL-MCNC: 0.8 MG/DL (ref 0–1.2)
BUN SERPL-MCNC: 26 MG/DL (ref 6–23)
CALCIUM SERPL-MCNC: 8.7 MG/DL (ref 8.6–10.3)
CHLORIDE SERPL-SCNC: 95 MMOL/L (ref 98–107)
CHOLEST SERPL-MCNC: 144 MG/DL (ref 0–199)
CHOLESTEROL/HDL RATIO: 3.8
CO2 SERPL-SCNC: 27 MMOL/L (ref 21–32)
CREAT SERPL-MCNC: 0.56 MG/DL (ref 0.5–1.3)
EGFRCR SERPLBLD CKD-EPI 2021: >90 ML/MIN/1.73M*2
ERYTHROCYTE [DISTWIDTH] IN BLOOD BY AUTOMATED COUNT: 13.2 % (ref 11.5–14.5)
EST. AVERAGE GLUCOSE BLD GHB EST-MCNC: 100 MG/DL
GLUCOSE SERPL-MCNC: 88 MG/DL (ref 74–99)
HBA1C MFR BLD: 5.1 %
HCT VFR BLD AUTO: 38.2 % (ref 41–52)
HDLC SERPL-MCNC: 38 MG/DL
HGB BLD-MCNC: 12.6 G/DL (ref 13.5–17.5)
LDLC SERPL CALC-MCNC: 73 MG/DL
MAGNESIUM SERPL-MCNC: 2.09 MG/DL (ref 1.6–2.4)
MCH RBC QN AUTO: 32.8 PG (ref 26–34)
MCHC RBC AUTO-ENTMCNC: 33 G/DL (ref 32–36)
MCV RBC AUTO: 100 FL (ref 80–100)
NON HDL CHOLESTEROL: 106 MG/DL (ref 0–149)
NRBC BLD-RTO: 0 /100 WBCS (ref 0–0)
PHOSPHATE SERPL-MCNC: 2.9 MG/DL (ref 2.5–4.9)
PLATELET # BLD AUTO: 145 X10*3/UL (ref 150–450)
POTASSIUM SERPL-SCNC: 3.6 MMOL/L (ref 3.5–5.3)
PROT SERPL-MCNC: 5.9 G/DL (ref 6.4–8.2)
RBC # BLD AUTO: 3.84 X10*6/UL (ref 4.5–5.9)
SODIUM SERPL-SCNC: 132 MMOL/L (ref 136–145)
T4 FREE SERPL-MCNC: 0.91 NG/DL (ref 0.61–1.12)
TRIGL SERPL-MCNC: 165 MG/DL (ref 0–149)
TSH SERPL-ACNC: 6.11 MIU/L (ref 0.44–3.98)
VALPROATE SERPL-MCNC: 78 UG/ML (ref 50–100)
VIT B12 SERPL-MCNC: 989 PG/ML (ref 211–911)
VLDL: 33 MG/DL (ref 0–40)
WBC # BLD AUTO: 7 X10*3/UL (ref 4.4–11.3)

## 2024-04-03 PROCEDURE — 2500000001 HC RX 250 WO HCPCS SELF ADMINISTERED DRUGS (ALT 637 FOR MEDICARE OP): Performed by: NURSE PRACTITIONER

## 2024-04-03 PROCEDURE — 85027 COMPLETE CBC AUTOMATED: CPT | Performed by: NURSE PRACTITIONER

## 2024-04-03 PROCEDURE — 3430000001 HC RX 343 DIAGNOSTIC RADIOPHARMACEUTICALS: Performed by: INTERNAL MEDICINE

## 2024-04-03 PROCEDURE — 2500000004 HC RX 250 GENERAL PHARMACY W/ HCPCS (ALT 636 FOR OP/ED): Performed by: NURSE PRACTITIONER

## 2024-04-03 PROCEDURE — 74230 X-RAY XM SWLNG FUNCJ C+: CPT | Performed by: RADIOLOGY

## 2024-04-03 PROCEDURE — 2500000001 HC RX 250 WO HCPCS SELF ADMINISTERED DRUGS (ALT 637 FOR MEDICARE OP): Performed by: INTERNAL MEDICINE

## 2024-04-03 PROCEDURE — 80164 ASSAY DIPROPYLACETIC ACD TOT: CPT | Performed by: NURSE PRACTITIONER

## 2024-04-03 PROCEDURE — 99222 1ST HOSP IP/OBS MODERATE 55: CPT

## 2024-04-03 PROCEDURE — 83036 HEMOGLOBIN GLYCOSYLATED A1C: CPT | Mod: STJLAB | Performed by: NURSE PRACTITIONER

## 2024-04-03 PROCEDURE — 84443 ASSAY THYROID STIM HORMONE: CPT | Performed by: NURSE PRACTITIONER

## 2024-04-03 PROCEDURE — 93970 EXTREMITY STUDY: CPT

## 2024-04-03 PROCEDURE — 92611 MOTION FLUOROSCOPY/SWALLOW: CPT | Mod: GN | Performed by: SPEECH-LANGUAGE PATHOLOGIST

## 2024-04-03 PROCEDURE — 84100 ASSAY OF PHOSPHORUS: CPT | Performed by: NURSE PRACTITIONER

## 2024-04-03 PROCEDURE — 92610 EVALUATE SWALLOWING FUNCTION: CPT | Mod: GN | Performed by: SPEECH-LANGUAGE PATHOLOGIST

## 2024-04-03 PROCEDURE — 84439 ASSAY OF FREE THYROXINE: CPT | Performed by: NURSE PRACTITIONER

## 2024-04-03 PROCEDURE — 2500000002 HC RX 250 W HCPCS SELF ADMINISTERED DRUGS (ALT 637 FOR MEDICARE OP, ALT 636 FOR OP/ED): Performed by: NURSE PRACTITIONER

## 2024-04-03 PROCEDURE — 80048 BASIC METABOLIC PNL TOTAL CA: CPT | Performed by: NURSE PRACTITIONER

## 2024-04-03 PROCEDURE — 83735 ASSAY OF MAGNESIUM: CPT | Performed by: NURSE PRACTITIONER

## 2024-04-03 PROCEDURE — 36415 COLL VENOUS BLD VENIPUNCTURE: CPT | Performed by: NURSE PRACTITIONER

## 2024-04-03 PROCEDURE — 84075 ASSAY ALKALINE PHOSPHATASE: CPT | Performed by: NURSE PRACTITIONER

## 2024-04-03 PROCEDURE — 74230 X-RAY XM SWLNG FUNCJ C+: CPT

## 2024-04-03 PROCEDURE — 80061 LIPID PANEL: CPT | Performed by: NURSE PRACTITIONER

## 2024-04-03 PROCEDURE — 93970 EXTREMITY STUDY: CPT | Performed by: SURGERY

## 2024-04-03 PROCEDURE — 82140 ASSAY OF AMMONIA: CPT | Performed by: NURSE PRACTITIONER

## 2024-04-03 PROCEDURE — 99222 1ST HOSP IP/OBS MODERATE 55: CPT | Performed by: NURSE PRACTITIONER

## 2024-04-03 PROCEDURE — 1200000002 HC GENERAL ROOM WITH TELEMETRY DAILY

## 2024-04-03 RX ORDER — SODIUM CHLORIDE 9 MG/ML
85 INJECTION, SOLUTION INTRAVENOUS CONTINUOUS
Status: DISCONTINUED | OUTPATIENT
Start: 2024-04-03 | End: 2024-04-03

## 2024-04-03 RX ORDER — POTASSIUM CHLORIDE 20 MEQ/1
20 TABLET, EXTENDED RELEASE ORAL ONCE
Status: DISCONTINUED | OUTPATIENT
Start: 2024-04-03 | End: 2024-04-03

## 2024-04-03 RX ORDER — POTASSIUM CHLORIDE 14.9 MG/ML
20 INJECTION INTRAVENOUS ONCE
Status: COMPLETED | OUTPATIENT
Start: 2024-04-03 | End: 2024-04-03

## 2024-04-03 RX ORDER — SODIUM CHLORIDE, SODIUM LACTATE, POTASSIUM CHLORIDE, CALCIUM CHLORIDE 600; 310; 30; 20 MG/100ML; MG/100ML; MG/100ML; MG/100ML
75 INJECTION, SOLUTION INTRAVENOUS CONTINUOUS
Status: DISCONTINUED | OUTPATIENT
Start: 2024-04-03 | End: 2024-04-04

## 2024-04-03 RX ADMIN — SODIUM CHLORIDE, POTASSIUM CHLORIDE, SODIUM LACTATE AND CALCIUM CHLORIDE 75 ML/HR: 600; 310; 30; 20 INJECTION, SOLUTION INTRAVENOUS at 14:17

## 2024-04-03 RX ADMIN — TAMSULOSIN HYDROCHLORIDE 0.4 MG: 0.4 CAPSULE ORAL at 20:14

## 2024-04-03 RX ADMIN — POTASSIUM CHLORIDE 20 MEQ: 14.9 INJECTION, SOLUTION INTRAVENOUS at 14:17

## 2024-04-03 RX ADMIN — BARIUM SULFATE 30 ML: 0.81 POWDER, FOR SUSPENSION ORAL at 11:38

## 2024-04-03 RX ADMIN — DIVALPROEX SODIUM 250 MG: 500 TABLET, DELAYED RELEASE ORAL at 20:16

## 2024-04-03 RX ADMIN — SIMVASTATIN 20 MG: 20 TABLET, FILM COATED ORAL at 20:14

## 2024-04-03 RX ADMIN — ASPIRIN 81 MG 81 MG: 81 TABLET ORAL at 08:47

## 2024-04-03 RX ADMIN — SODIUM CHLORIDE 85 ML/HR: 9 INJECTION, SOLUTION INTRAVENOUS at 01:44

## 2024-04-03 RX ADMIN — BARIUM SULFATE 5 ML: 400 SUSPENSION ORAL at 11:43

## 2024-04-03 RX ADMIN — LEVOTHYROXINE SODIUM 50 MCG: 50 TABLET ORAL at 06:03

## 2024-04-03 RX ADMIN — BARIUM SULFATE 5 ML: 400 PASTE ORAL at 11:44

## 2024-04-03 RX ADMIN — Medication 3 MG: at 20:09

## 2024-04-03 RX ADMIN — DIVALPROEX SODIUM 2000 MG: 500 TABLET, DELAYED RELEASE ORAL at 20:14

## 2024-04-03 RX ADMIN — BARIUM SULFATE 85 ML: 400 SUSPENSION ORAL at 11:41

## 2024-04-03 RX ADMIN — ACETAMINOPHEN 650 MG: 325 TABLET ORAL at 20:14

## 2024-04-03 RX ADMIN — ENOXAPARIN SODIUM 40 MG: 40 INJECTION SUBCUTANEOUS at 08:47

## 2024-04-03 ASSESSMENT — COGNITIVE AND FUNCTIONAL STATUS - GENERAL
DRESSING REGULAR LOWER BODY CLOTHING: A LOT
TOILETING: A LOT
DRESSING REGULAR UPPER BODY CLOTHING: A LOT
MOBILITY SCORE: 10
WALKING IN HOSPITAL ROOM: TOTAL
MOVING FROM LYING ON BACK TO SITTING ON SIDE OF FLAT BED WITH BEDRAILS: A LOT
STANDING UP FROM CHAIR USING ARMS: A LOT
TOILETING: A LOT
EATING MEALS: A LOT
MOVING FROM LYING ON BACK TO SITTING ON SIDE OF FLAT BED WITH BEDRAILS: A LOT
DRESSING REGULAR LOWER BODY CLOTHING: A LOT
TURNING FROM BACK TO SIDE WHILE IN FLAT BAD: A LOT
MOBILITY SCORE: 10
MOVING TO AND FROM BED TO CHAIR: A LOT
EATING MEALS: A LOT
DRESSING REGULAR UPPER BODY CLOTHING: A LOT
WALKING IN HOSPITAL ROOM: TOTAL
DAILY ACTIVITIY SCORE: 12
CLIMB 3 TO 5 STEPS WITH RAILING: TOTAL
CLIMB 3 TO 5 STEPS WITH RAILING: TOTAL
PERSONAL GROOMING: A LOT
DAILY ACTIVITIY SCORE: 12
MOVING TO AND FROM BED TO CHAIR: A LOT
PERSONAL GROOMING: A LOT
TURNING FROM BACK TO SIDE WHILE IN FLAT BAD: A LOT
HELP NEEDED FOR BATHING: A LOT
HELP NEEDED FOR BATHING: A LOT
STANDING UP FROM CHAIR USING ARMS: A LOT

## 2024-04-03 ASSESSMENT — PAIN SCALES - GENERAL
PAINLEVEL_OUTOF10: 0 - NO PAIN

## 2024-04-03 ASSESSMENT — PAIN - FUNCTIONAL ASSESSMENT: PAIN_FUNCTIONAL_ASSESSMENT: 0-10

## 2024-04-03 ASSESSMENT — ACTIVITIES OF DAILY LIVING (ADL): LACK_OF_TRANSPORTATION: NO

## 2024-04-03 NOTE — CARE PLAN
The patient's goals for the shift include      The clinical goals for the shift include remain free from falls this shift

## 2024-04-03 NOTE — CONSULTS
Inpatient consult to Neurology  Consult performed by: Jennifer Fisher MD  Consult ordered by: Chyna Reyes, APRN-CNP  Reason for consult: AMS  Assessment/Recommendations: MRI w/w/o contrast  Routine EEG   Check B12 level          Reason For Consult  AMS, hx of CVA & seizures    History Of Present Illness  69 YOM PMH of carotid dissection, L MCA CVA 2001 with residual R hemiparesis who presented from facility for AMS & deconditioning.    He had carotid dissection in 2001, due to dissection had a L MCA stroke, and has had residual hemiparesis since that time.  Also has known hx of seizures on depakote.  He is originally from [place].  He started to have cognitive decline and worsening deconditioning 4-5 months ago that has progressively worsened.  He was in assisted living, but was a 3 person transfer, which wasn't appropriate for AL, so was admitted for placement.  He was also noted to be A&Ox0, mild hyponatremia of 128, urinalysis negative.  Urinalysis negative, depakote level WNL. CT head negative for intracranial pathology but did show encephalomalacia in L MCA territory from previous MCA stroke.      Of note he was admitted for metabolic encephalopathy in 2022 after being found down.  EEG done at that time showed: 1. Continuous left hemispheric slowing & diffuse delta/theta slowing of background frequencies. Also had CT done at that time that also showed old left hemisphere infarct and possible cyst.  Neurology consulted for AMS.    Spoke with Brother, notes prior to January patient lived by himself, and ambulated with cane, now cannot walk and a 3 person assist.  He has had a steep decline over 1-2 months.  Was moved up to [place] 2 weeks ago, and moved to assisted living 1 week ago.     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 (CMS/[facility]), Bradycardia (02/10/2022), Carotid artery dissection (CMS/[facility]), Cellulitis, Hemiplegia (CMS/[facility]), History of stroke with  residual deficit (02/10/2022), Hypertension, Hypothermia due to exposure (02/10/2022), Hypothyroidism, Mild protein-calorie malnutrition (CMS/HCC) (02/10/2022), Non-traumatic rhabdomyolysis (02/10/2022), Seizure (CMS/[facility]), Stroke (CMS/[facility]), and Thrombocytopenia (CMS/[facility]) (02/10/2022).    Surgical History  He has no past surgical history on file.     Social History  He reports that he does not currently use alcohol. Drug use questions deferred to the physician. No history on file for tobacco use.    Family History  No family history on file.     Allergies  Patient has no known allergies.    Review of Systems  Unable to obtain due to mentation      Physical Exam  Constitutional: A&Ox0, frail, elderly gentleman NAD  Head and Face: Atraumatic, normocephalic   Eyes: Normal external exam, EOMI, PERRLA  Cardiovascular: RRR, S1/S2, no murmurs, rubs, or gallops, radial pulses +2  Pulmonary: CTAB, no respiratory distress, no wheezing, rales or rhonchi, on RA  Abdomen: +BS, soft, non-tender, nondistended, no guarding rigidity or rebound tenderness, no masses noted  MSK: Negative for edema, No joint swelling, normal movements of all extremities.   Neuro: R sided hemiparesis RUE & RLE, LUE 3/5 strength, LLE 3/5 strength, dysarthria noted, A&Ox0, follows some commands  Skin- No lesions, contusions, or erythema.  Psychiatric: Judgment intact. Appropriate mood, affect and behavior        Last Recorded Vitals  /84 (BP Location: Left arm, Patient Position: Lying)   Pulse 86   Temp 36.6 °C (97.9 °F) (Temporal)   Resp 18   Wt 83.9 kg (185 lb)   SpO2 92%     Relevant Results  Results for orders placed or performed during the hospital encounter of 04/02/24 (from the past 24 hour(s))   Basic metabolic panel   Result Value Ref Range    Glucose 88 74 - 99 mg/dL    Sodium 132 (L) 136 - 145 mmol/L    Potassium 3.6 3.5 - 5.3 mmol/L    Chloride 95 (L) 98 - 107 mmol/L    Bicarbonate 27 21 - 32 mmol/L    Anion Gap 14 10 - 20 mmol/L     Urea Nitrogen 26 (H) 6 - 23 mg/dL    Creatinine 0.56 0.50 - 1.30 mg/dL    eGFR >90 >60 mL/min/1.73m*2    Calcium 8.7 8.6 - 10.3 mg/dL   Phosphorus   Result Value Ref Range    Phosphorus 2.9 2.5 - 4.9 mg/dL   Magnesium   Result Value Ref Range    Magnesium 2.09 1.60 - 2.40 mg/dL   Hepatic function panel   Result Value Ref Range    Albumin 2.9 (L) 3.4 - 5.0 g/dL    Bilirubin, Total 0.8 0.0 - 1.2 mg/dL    Bilirubin, Direct 0.2 0.0 - 0.3 mg/dL    Alkaline Phosphatase 75 33 - 136 U/L    ALT 9 (L) 10 - 52 U/L    AST 17 9 - 39 U/L    Total Protein 5.9 (L) 6.4 - 8.2 g/dL   Ammonia   Result Value Ref Range    Ammonia 34 16 - 53 umol/L   TSH with reflex to Free T4 if abnormal   Result Value Ref Range    Thyroid Stimulating Hormone 6.11 (H) 0.44 - 3.98 mIU/L   Valproic acid level, total   Result Value Ref Range    Valproic Acid 78 50 - 100 ug/mL   Hemoglobin A1C   Result Value Ref Range    Hemoglobin A1C 5.1 see below %    Estimated Average Glucose 100 Not Established mg/dL   Lipid Panel   Result Value Ref Range    Cholesterol 144 0 - 199 mg/dL    HDL-Cholesterol 38.0 mg/dL    Cholesterol/HDL Ratio 3.8     LDL Calculated 73 <=99 mg/dL    VLDL 33 0 - 40 mg/dL    Triglycerides 165 (H) 0 - 149 mg/dL    Non HDL Cholesterol 106 0 - 149 mg/dL   Thyroxine, Free   Result Value Ref Range    Thyroxine, Free 0.91 0.61 - 1.12 ng/dL   CBC   Result Value Ref Range    WBC 7.0 4.4 - 11.3 x10*3/uL    nRBC 0.0 0.0 - 0.0 /100 WBCs    RBC 3.84 (L) 4.50 - 5.90 x10*6/uL    Hemoglobin 12.6 (L) 13.5 - 17.5 g/dL    Hematocrit 38.2 (L) 41.0 - 52.0 %     80 - 100 fL    MCH 32.8 26.0 - 34.0 pg    MCHC 33.0 32.0 - 36.0 g/dL    RDW 13.2 11.5 - 14.5 %    Platelets 145 (L) 150 - 450 x10*3/uL         Assessment/Plan     Hx of Epilepsy  Remote L MCA stroke  Residual R hemiparesis  Encephalopathy of unknown origin    - Check B12 level  - Obtain routine EEG  - MRI brain W/W/O contrast     Discussed with Dr. Phillip Simon, DO

## 2024-04-03 NOTE — CARE PLAN
The patient's goals for the shift include    Problem: Pain  Goal: My pain/discomfort is manageable  Outcome: Progressing     Problem: Safety  Goal: Patient will be injury free during hospitalization  Outcome: Progressing  Goal: I will remain free of falls  Outcome: Progressing     Problem: Daily Care  Goal: Daily care needs are met  Outcome: Progressing     Problem: Psychosocial Needs  Goal: Demonstrates ability to cope with hospitalization/illness  Outcome: Progressing  Goal: Collaborate with me, my family, and caregiver to identify my specific goals  Outcome: Progressing     Problem: Discharge Barriers  Goal: My discharge needs are met  Outcome: Progressing     Problem: Skin  Goal: Participates in plan/prevention/treatment measures  Outcome: Progressing  Flowsheets (Taken 4/3/2024 1109)  Participates in plan/prevention/treatment measures:   Elevate heels   Increase activity/out of bed for meals  Goal: Prevent/manage excess moisture  Outcome: Progressing  Flowsheets (Taken 4/3/2024 1109)  Prevent/manage excess moisture:   Moisturize dry skin   Cleanse incontinence/protect with barrier cream  Goal: Prevent/minimize sheer/friction injuries  Outcome: Progressing  Flowsheets (Taken 4/3/2024 1109)  Prevent/minimize sheer/friction injuries:   Turn/reposition every 2 hours/use positioning/transfer devices   HOB 30 degrees or less   Use pull sheet  Goal: Promote/optimize nutrition  Outcome: Progressing  Flowsheets (Taken 4/3/2024 1109)  Promote/optimize nutrition:   Monitor/record intake including meals   Discuss with provider if NPO > 2 days  Goal: Promote skin healing  Outcome: Progressing  Flowsheets (Taken 4/3/2024 1109)  Promote skin healing:   Turn/reposition every 2 hours/use positioning/transfer devices   Assess skin/pad under line(s)/device(s)     Problem: Fall/Injury  Goal: Not fall by end of shift  Outcome: Progressing  Goal: Be free from injury by end of the shift  Outcome: Progressing  Goal: Verbalize  understanding of personal risk factors for fall in the hospital  Outcome: Progressing  Goal: Verbalize understanding of risk factor reduction measures to prevent injury from fall in the home  Outcome: Progressing  Goal: Use assistive devices by end of the shift  Outcome: Progressing  Goal: Pace activities to prevent fatigue by end of the shift  Outcome: Progressing     Problem: Pain - Adult  Goal: Verbalizes/displays adequate comfort level or baseline comfort level  Outcome: Progressing     Problem: Safety - Adult  Goal: Free from fall injury  Outcome: Progressing     Problem: Discharge Planning  Goal: Discharge to home or other facility with appropriate resources  Outcome: Progressing     Problem: Chronic Conditions and Co-morbidities  Goal: Patient's chronic conditions and co-morbidity symptoms are monitored and maintained or improved  Outcome: Progressing       The clinical goals for the shift include pt will be free of further neurological deficits this shift

## 2024-04-03 NOTE — PROGRESS NOTES
04/03/24 1341   Discharge Planning   Living Arrangements Alone   Support Systems Family members   Assistance Needed yes   Type of Residence Assisted living   Care Facility Name [facility]   Patient expects to be discharged to: Assited Living vs. SNF   Does the patient need discharge transport arranged? Yes   RoundTrip coordination needed? Yes   Has discharge transport been arranged? No   Financial Resource Strain   How hard is it for you to pay for the very basics like food, housing, medical care, and heating? Not hard   Housing Stability   In the last 12 months, was there a time when you were not able to pay the mortgage or rent on time? N   In the last 12 months, how many places have you lived? 2   In the last 12 months, was there a time when you did not have a steady place to sleep or slept in a shelter (including now)? N   Transportation Needs   In the past 12 months, has lack of transportation kept you from medical appointments or from getting medications? no   In the past 12 months, has lack of transportation kept you from meetings, work, or from getting things needed for daily living? No   Patient Choice   Provider Choice list and CMS website (https://medicare.gov/care-compare#search) for post-acute Quality and Resource Measure Data were provided and reviewed with: Family     Met with the patient and the family at the bedside. His brother Raj Emily/POA was present. Confirmed patient is a resident at [facility] but they are considering SNF placement with possible long term placement. [facility], the poa is unsure the degree of connectively. Patient is wheelchair bound with previous CVA with right sided paralysis. Family given a choice list with Medicare only and one with Medicare with [facility]. Family review and pending pt/ot results will determine needs.

## 2024-04-03 NOTE — H&P
History Of Present Illness  Dimas Ann is a 69 y.o. 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 Patient recently transferred from [place] to [place]. History is pretty limited as pt is aphasic. Pt's work up in ER showed,  CMP with sodium 128,  Chest x-ray moderate size left pleural effusion with associated left basilar atelectasis.  He was admitted on 3 S tele for further care.        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 (CMS/[facility]), Bradycardia (02/10/2022), Carotid artery dissection (CMS/[facility]), Cellulitis, Hemiplegia (CMS/[facility]), History of stroke with residual deficit (02/10/2022), Hypertension, Hypothermia due to exposure (02/10/2022), Hypothyroidism, Mild protein-calorie malnutrition (CMS/[facility]) (02/10/2022), Non-traumatic rhabdomyolysis (02/10/2022), Seizure (CMS/[facility]), Stroke (CMS/[facility]), and Thrombocytopenia (CMS/[facility]) (02/10/2022).    Surgical History  He has no past surgical history on file.     Social History  He reports that he has never smoked. He has never used smokeless tobacco. He reports that he does not currently use alcohol. Drug use questions deferred to the physician.    Family History  No family history on file.     Allergies  Patient has no known allergies.    Current medications:      Current Facility-Administered Medications:     acetaminophen (Tylenol) tablet 650 mg, 650 mg, oral, q6h PRN, ANNA Duarte    aspirin chewable tablet 81 mg, 81 mg, oral, Daily, ANNA Duarte, 81 mg at 04/03/24 0847    divalproex (Depakote) EC tablet 2,000 mg, 2,000 mg, oral, Nightly, ANNA Duarte, 2,000 mg at 04/02/24 2100    divalproex (Depakote) EC tablet 250 mg, 250 mg, oral, Nightly, ANNA Duarte, 250 mg at 04/02/24 2044    enoxaparin (Lovenox) syringe 40 mg, 40 mg, subcutaneous, Daily, Chyna Reyes APRN-CNP, 40 mg  at 04/03/24 0847    lactated Ringer's infusion, 75 mL/hr, intravenous, Continuous, ANNA Glover, Last Rate: 75 mL/hr at 04/03/24 1443, 75 mL/hr at 04/03/24 1443    latanoprost (Xalatan) 0.005 % ophthalmic solution 1 drop, 1 drop, Both Eyes, Nightly, ANNA Duarte, 1 drop at 04/02/24 2048    levothyroxine (Synthroid, Levoxyl) tablet 50 mcg, 50 mcg, oral, Daily before breakfast, ANNA Duarte, 50 mcg at 04/03/24 0603    magnesium hydroxide (Milk of Magnesia) 2,400 mg/10 mL suspension 30 mL, 30 mL, oral, Daily PRN, ANNA Duarte    melatonin tablet 3 mg, 3 mg, oral, Nightly PRN, ANNA Duarte    polyethylene glycol (Glycolax, Miralax) packet 17 g, 17 g, oral, Daily PRN, ANNA Duarte    potassium chloride 20 mEq in 100 mL IV premix, 20 mEq, intravenous, Once, ANNA Glover, Last Rate: 50 mL/hr at 04/03/24 1417, 20 mEq at 04/03/24 1417    simvastatin (Zocor) tablet 20 mg, 20 mg, oral, Nightly, ANNA Duarte, 20 mg at 04/02/24 2045    tamsulosin (Flomax) 24 hr capsule 0.4 mg, 0.4 mg, oral, Nightly, ANNA Duarte, 0.4 mg at 04/02/24 2045       Review of Systems     Limited due to underlying condition.    Physical Exam  HENT:      Head: Normocephalic.      Mouth/Throat:      Pharynx: Oropharynx is clear.   Eyes:      Conjunctiva/sclera: Conjunctivae normal.   Cardiovascular:      Rate and Rhythm: Regular rhythm.   Pulmonary:      Breath sounds: Normal breath sounds.   Abdominal:      General: Bowel sounds are normal.      Palpations: Abdomen is soft.   Musculoskeletal:         General: Normal range of motion.   Skin:     General: Skin is warm and dry.   Neurological:      General: No focal deficit present.      Mental Status: He is alert.   Psychiatric:         Behavior: Behavior normal.              Last Recorded Vitals      Blood pressure 153/84, pulse 78, temperature 36.5 °C (97.7 °F), temperature  "source Temporal, resp. rate 17, height 1.778 m (5' 10\"), weight 83.9 kg (185 lb), SpO2 93 %.    Relevant Results     Results for orders placed or performed during the hospital encounter of 04/02/24 (from the past 24 hour(s))   Basic metabolic panel   Result Value Ref Range    Glucose 99 74 - 99 mg/dL    Sodium 132 (L) 136 - 145 mmol/L    Potassium 3.6 3.5 - 5.3 mmol/L    Chloride 94 (L) 98 - 107 mmol/L    Bicarbonate 30 21 - 32 mmol/L    Anion Gap 12 10 - 20 mmol/L    Urea Nitrogen 24 (H) 6 - 23 mg/dL    Creatinine 0.60 0.50 - 1.30 mg/dL    eGFR >90 >60 mL/min/1.73m*2    Calcium 8.9 8.6 - 10.3 mg/dL   Basic metabolic panel   Result Value Ref Range    Glucose 88 74 - 99 mg/dL    Sodium 132 (L) 136 - 145 mmol/L    Potassium 3.6 3.5 - 5.3 mmol/L    Chloride 95 (L) 98 - 107 mmol/L    Bicarbonate 27 21 - 32 mmol/L    Anion Gap 14 10 - 20 mmol/L    Urea Nitrogen 26 (H) 6 - 23 mg/dL    Creatinine 0.56 0.50 - 1.30 mg/dL    eGFR >90 >60 mL/min/1.73m*2    Calcium 8.7 8.6 - 10.3 mg/dL   Phosphorus   Result Value Ref Range    Phosphorus 2.9 2.5 - 4.9 mg/dL   Magnesium   Result Value Ref Range    Magnesium 2.09 1.60 - 2.40 mg/dL   Hepatic function panel   Result Value Ref Range    Albumin 2.9 (L) 3.4 - 5.0 g/dL    Bilirubin, Total 0.8 0.0 - 1.2 mg/dL    Bilirubin, Direct 0.2 0.0 - 0.3 mg/dL    Alkaline Phosphatase 75 33 - 136 U/L    ALT 9 (L) 10 - 52 U/L    AST 17 9 - 39 U/L    Total Protein 5.9 (L) 6.4 - 8.2 g/dL   Ammonia   Result Value Ref Range    Ammonia 34 16 - 53 umol/L   TSH with reflex to Free T4 if abnormal   Result Value Ref Range    Thyroid Stimulating Hormone 6.11 (H) 0.44 - 3.98 mIU/L   Valproic acid level, total   Result Value Ref Range    Valproic Acid 78 50 - 100 ug/mL   Lipid Panel   Result Value Ref Range    Cholesterol 144 0 - 199 mg/dL    HDL-Cholesterol 38.0 mg/dL    Cholesterol/HDL Ratio 3.8     LDL Calculated 73 <=99 mg/dL    VLDL 33 0 - 40 mg/dL    Triglycerides 165 (H) 0 - 149 mg/dL    Non HDL " Cholesterol 106 0 - 149 mg/dL   Thyroxine, Free   Result Value Ref Range    Thyroxine, Free 0.91 0.61 - 1.12 ng/dL   CBC   Result Value Ref Range    WBC 7.0 4.4 - 11.3 x10*3/uL    nRBC 0.0 0.0 - 0.0 /100 WBCs    RBC 3.84 (L) 4.50 - 5.90 x10*6/uL    Hemoglobin 12.6 (L) 13.5 - 17.5 g/dL    Hematocrit 38.2 (L) 41.0 - 52.0 %     80 - 100 fL    MCH 32.8 26.0 - 34.0 pg    MCHC 33.0 32.0 - 36.0 g/dL    RDW 13.2 11.5 - 14.5 %    Platelets 145 (L) 150 - 450 x10*3/uL          Radiology  XR CHEST 1 VIEW;  4/2/2024 9:52 am      INDICATION:  Signs/Symptoms:co.      COMPARISON:  None.      ACCESSION NUMBER(S):  [id number]      ORDERING CLINICIAN:  MARY CAAL      TECHNIQUE:  Single AP portable view of the chest was obtained.      FINDINGS:  MEDIASTINUM/ LUNGS/ STANLEY:  Cardiomegaly. There is central vascular congestion. Moderate-sized  left pleural effusion with associated left basilar atelectasis. No  gross right pleural effusion.      No pneumothorax.  No tracheal deviation.  No abnormal hilar fullness or gross mass on either side.      BONES:  No lytic or blastic destructive bone lesion.      UPPER ABDOMEN:  Grossly intact.      IMPRESSION:  Findings consistent with ongoing CH F.      Moderate-sized left pleural effusion with associated left basilar  atelectasis.        Assessment/Plan   Principal Problem:    Declining functional status  Active Problems:    History of stroke with residual deficit    Hyponatremia    Pleural effusion on left    Elevated AST (SGOT)    Generalized weakness    Hypothyroidism    Hypertension      PLAN: Pt was admitted on 3 S tele floor, monitor on tele, IVF, neuro checks, may need Neurology consult,  may need thoracentesis, med list and labs reviewed for now c/w current Rx, DVT prophylaxis, d/w available family in room, follow closely.            Zhen Dudley MD

## 2024-04-03 NOTE — H&P
History Of Present Illness  Dimas Ann is a 69 y.o. 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  Patient recently transferred from [place] to [place], therefore staff was unable to tell EMS what his baseline is.  On eval, patient aphasic, however following commands on left side.  Patient unable to provide information on HPI, history, or ROS.    In the ED, vital signs with temp 36.2, HR 85, RR 20, /70, SpO2 94% on room air.  CBC unremarkable.  CMP with sodium 128> 132, chloride 94, BUN 24, AST 44.  BNP WNL at 26.  High-sensitivity troponin WNL at 3.  UA with 4 urobilinogen, 5 ketones, 100 protein.  Chest x-ray moderate size left pleural effusion with associated left basilar atelectasis.  CT head no acute intracranial bleed or focal mass effect.  Changes from large old infarct in the left MCA distribution.  No meds given in the ED.  Patient admitted with declining functional status, hyponatremia, elevated AST, left pleural effusion, and generalized weakness with consult to case management, PT, OT, and speech.    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 (CMS/HCC), Bradycardia (02/10/2022), Carotid artery dissection (CMS/HCC), Cellulitis, Hemiplegia (CMS/[facility]), History of stroke with residual deficit (02/10/2022), Hypertension, Hypothermia due to exposure (02/10/2022), Hypothyroidism, Mild protein-calorie malnutrition (CMS/HCC) (02/10/2022), Non-traumatic rhabdomyolysis (02/10/2022), Seizure (CMS/HCC), Stroke (CMS/HCC), and Thrombocytopenia (CMS/HCC) (02/10/2022).    Surgical History  He has no past surgical history on file.     Social  "History  He reports that he does not currently use alcohol. Drug use questions deferred to the physician. No history on file for tobacco use.    Family History  No family history on file.     Allergies  Patient has no known allergies.    Review of Systems   Reason unable to perform ROS: Due to new aphasia/cognitive impairment.               Physical Exam  Constitutional: Awake and alert, Calm, and Cooperative. Speech clear. No acute distress.  Skin: Pink, warm, and dry. No lesions or rashes.  Eyes: PERRL, sclera clear, No swelling or drainage noted  ENMT: Mucous membranes pink and dry, no apparent injury, no lesions seen  Head/Neck: Neck Supple, no apparent injury, trachea midline, no palpable masses on head  Cardiac: Regular rhythm and rate, Auscultated S1 & S2, no murmurs  Lungs: Diminished in bilateral bases, symmetrical chest rise, no accessory muscle usage, unlabored  Abdomen: Soft, non-tender, no rebound tenderness or guarding, nondistended, positive bowel sounds in all quadrants  Musculoskeletal: ROM intact to left side, no joint swelling, normal strength to L UE  Extremities: RLE 4+ pitting, L LE 3+ pitting, edema, No cyanosis, 1+ pulses of the extremities, right hand contracture, see skin assessment for wounds  Neurological: Alert and Oriented x 0, unable to move right side  Psychological: Appropriate mood and behavior.       Last Recorded Vitals  Blood pressure 131/75, pulse 84, temperature 36.7 °C (98.1 °F), temperature source Temporal, resp. rate 20, height 1.778 m (5' 10\"), weight 83.9 kg (185 lb), SpO2 91 %.  Visit Vitals  /75 (BP Location: Left arm, Patient Position: Lying)   Pulse 84   Temp 36.7 °C (98.1 °F) (Temporal)   Resp 20   Ht 1.778 m (5' 10\")   Wt 83.9 kg (185 lb)   SpO2 91%   BMI 26.54 kg/m²   Smoking Status Unknown   BSA 2.04 m²     Weight: 83.9 kg (185 lb)   Pain Assessment: 0-10  Pain Score: 0 - No pain        Relevant Results  Results for orders placed or performed during the " hospital encounter of 04/02/24 (from the past 24 hour(s))   ECG 12 lead   Result Value Ref Range    Ventricular Rate 87 BPM    Atrial Rate 87 BPM    MS Interval 164 ms    QRS Duration 92 ms    QT Interval 350 ms    QTC Calculation(Bazett) 421 ms    P Axis 30 degrees    R Axis 23 degrees    T Axis 85 degrees    QRS Count 14 beats    Q Onset 219 ms    P Onset 137 ms    P Offset 187 ms    T Offset 394 ms    QTC Fredericia 396 ms   POCT GLUCOSE   Result Value Ref Range    POCT Glucose 89 74 - 99 mg/dL   CBC and Auto Differential   Result Value Ref Range    WBC 8.7 4.4 - 11.3 x10*3/uL    nRBC 0.0 0.0 - 0.0 /100 WBCs    RBC 3.94 (L) 4.50 - 5.90 x10*6/uL    Hemoglobin 12.8 (L) 13.5 - 17.5 g/dL    Hematocrit 38.7 (L) 41.0 - 52.0 %    MCV 98 80 - 100 fL    MCH 32.5 26.0 - 34.0 pg    MCHC 33.1 32.0 - 36.0 g/dL    RDW 13.3 11.5 - 14.5 %    Platelets 151 150 - 450 x10*3/uL    Neutrophils % 60.9 40.0 - 80.0 %    Immature Granulocytes %, Automated 2.5 (H) 0.0 - 0.9 %    Lymphocytes % 22.7 13.0 - 44.0 %    Monocytes % 13.6 2.0 - 10.0 %    Eosinophils % 0.1 0.0 - 6.0 %    Basophils % 0.2 0.0 - 2.0 %    Neutrophils Absolute 5.31 1.60 - 5.50 x10*3/uL    Immature Granulocytes Absolute, Automated 0.22 0.00 - 0.50 x10*3/uL    Lymphocytes Absolute 1.98 0.80 - 3.00 x10*3/uL    Monocytes Absolute 1.19 (H) 0.05 - 0.80 x10*3/uL    Eosinophils Absolute 0.01 0.00 - 0.40 x10*3/uL    Basophils Absolute 0.02 0.00 - 0.10 x10*3/uL   Comprehensive metabolic panel   Result Value Ref Range    Glucose 97 74 - 99 mg/dL    Sodium 128 (L) 136 - 145 mmol/L    Potassium 5.8 (H) 3.5 - 5.3 mmol/L    Chloride 93 (L) 98 - 107 mmol/L    Bicarbonate 30 21 - 32 mmol/L    Anion Gap 11 10 - 20 mmol/L    Urea Nitrogen 24 (H) 6 - 23 mg/dL    Creatinine 0.60 0.50 - 1.30 mg/dL    eGFR >90 >60 mL/min/1.73m*2    Calcium 8.4 (L) 8.6 - 10.3 mg/dL    Albumin 3.0 (L) 3.4 - 5.0 g/dL    Alkaline Phosphatase 79 33 - 136 U/L    Total Protein 6.1 (L) 6.4 - 8.2 g/dL    AST 44 (H) 9  - 39 U/L    Bilirubin, Total 0.7 0.0 - 1.2 mg/dL    ALT 12 10 - 52 U/L   Troponin I, High Sensitivity   Result Value Ref Range    Troponin I, High Sensitivity 3 0 - 20 ng/L   Protime-INR   Result Value Ref Range    Protime 12.9 (H) 9.8 - 12.8 seconds    INR 1.1 0.9 - 1.1   APTT   Result Value Ref Range    aPTT 26 (L) 27 - 38 seconds   Basic metabolic panel   Result Value Ref Range    Glucose 97 74 - 99 mg/dL    Sodium 130 (L) 136 - 145 mmol/L    Potassium 5.9 (H) 3.5 - 5.3 mmol/L    Chloride 94 (L) 98 - 107 mmol/L    Bicarbonate 28 21 - 32 mmol/L    Anion Gap 14 10 - 20 mmol/L    Urea Nitrogen 24 (H) 6 - 23 mg/dL    Creatinine 0.58 0.50 - 1.30 mg/dL    eGFR >90 >60 mL/min/1.73m*2    Calcium 8.5 (L) 8.6 - 10.3 mg/dL   B-type natriuretic peptide   Result Value Ref Range    BNP 26 0 - 99 pg/mL   Urinalysis with Reflex Culture and Microscopic   Result Value Ref Range    Color, Urine Karlene (N) Straw, Yellow    Appearance, Urine Clear Clear    Specific Gravity, Urine 1.033 1.005 - 1.035    pH, Urine 5.0 5.0, 5.5, 6.0, 6.5, 7.0, 7.5, 8.0    Protein, Urine 100 (2+) (N) NEGATIVE mg/dL    Glucose, Urine NEGATIVE NEGATIVE mg/dL    Blood, Urine NEGATIVE NEGATIVE    Ketones, Urine 5 (TRACE) (A) NEGATIVE mg/dL    Bilirubin, Urine NEGATIVE NEGATIVE    Urobilinogen, Urine 4.0 (N) <2.0 mg/dL    Nitrite, Urine NEGATIVE NEGATIVE    Leukocyte Esterase, Urine NEGATIVE NEGATIVE   Extra Urine Gray Tube   Result Value Ref Range    Extra Tube Hold for add-ons.    Urinalysis Microscopic   Result Value Ref Range    WBC, Urine 1-5 1-5, NONE /HPF    RBC, Urine 3-5 NONE, 1-2, 3-5 /HPF   Basic metabolic panel   Result Value Ref Range    Glucose 99 74 - 99 mg/dL    Sodium 132 (L) 136 - 145 mmol/L    Potassium 3.6 3.5 - 5.3 mmol/L    Chloride 94 (L) 98 - 107 mmol/L    Bicarbonate 30 21 - 32 mmol/L    Anion Gap 12 10 - 20 mmol/L    Urea Nitrogen 24 (H) 6 - 23 mg/dL    Creatinine 0.60 0.50 - 1.30 mg/dL    eGFR >90 >60 mL/min/1.73m*2    Calcium 8.9  8.6 - 10.3 mg/dL      XR chest 1 view    Result Date: 4/2/2024  Interpreted By:  Jayce Landeros, STUDY: XR CHEST 1 VIEW;  4/2/2024 9:52 am   INDICATION: Signs/Symptoms:co.   COMPARISON: None.   ACCESSION NUMBER(S): [id number]   ORDERING CLINICIAN: MARY CAAL   TECHNIQUE: Single AP portable view of the chest was obtained.   FINDINGS: MEDIASTINUM/ LUNGS/ STANLEY: Cardiomegaly. There is central vascular congestion. Moderate-sized left pleural effusion with associated left basilar atelectasis. No gross right pleural effusion.   No pneumothorax. No tracheal deviation. No abnormal hilar fullness or gross mass on either side.   BONES: No lytic or blastic destructive bone lesion.   UPPER ABDOMEN: Grossly intact.       Findings consistent with ongoing CH F.   Moderate-sized left pleural effusion with associated left basilar atelectasis.   MACRO: None   Signed by: Jayce Landeros 4/2/2024 10:29 AM Dictation workstation:   [id number]    CT head wo IV contrast    Result Date: 4/2/2024  Interpreted By:  Jayce Landeros, STUDY: CT HEAD WO IV CONTRAST;  4/2/2024 9:21 am   INDICATION: Signs/Symptoms:ams.   COMPARISON: None.   ACCESSION NUMBER(S): [id number]   ORDERING CLINICIAN: ARUBA JOHANSEN   TECHNIQUE: Routine axial images were obtained from the skull base through the vertex.  Sagittal and coronal reconstruction images were generated. Brain, subdural, and bone windows were reviewed.   FINDINGS: INTRACRANIAL: There is a well-defined oval-shaped calcified scalp nodule in the left paramidline  of the high parietal calvarium, measuring 22  mm transversely by 12 mm longitudinally by 17 mm AP. Moderate prominence of ventricles and sulci. There is mild patchy hypodensity throughout the deep periventricular white matter. Large area of encephalomalacia and associated white matter hypodensity in the left MCA distribution consistent with old infarct. No acute intracranial bleed, midline shift, or focal mass effect. No destructive bone lesion.  No depressed skull fracture.     EXTRACRANIAL: Visualized paranasal sinuses were clear. Visualized mastoid air cells were clear.       No acute intracranial bleed or focal mass effect.   Changes from large old infarct in the left MCA distribution.   Moderate volume loss.   Mild chronic white matter ischemic disease in the deep periventricular regions.   High left paramidline parietal scalp calcified nodule. Old calcified hematoma versus calcified sebaceous cyst.   MACRO: None   Signed by: Jayce Landeros 4/2/2024 9:38 AM Dictation workstation:   [id number]    ECG 12 lead    Result Date: 4/2/2024  Normal sinus rhythm Nonspecific T wave abnormality Abnormal ECG No previous ECGs available     Encounter Date: 04/02/24   ECG 12 lead   Result Value    Ventricular Rate 87    Atrial Rate 87    UT Interval 164    QRS Duration 92    QT Interval 350    QTC Calculation(Bazett) 421    P Axis 30    R Axis 23    T Axis 85    QRS Count 14    Q Onset 219    P Onset 137    P Offset 187    T Offset 394    QTC Fredericia 396    Narrative    Normal sinus rhythm  Nonspecific T wave abnormality  Abnormal ECG  No previous ECGs available          Home Medications  Prior to Admission medications    Medication Sig Start Date End Date Taking? Authorizing Provider   acetaminophen (Tylenol) 325 mg tablet Take 2 tablets (650 mg) by mouth every 6 hours if needed for mild pain (1 - 3) or fever (temp greater than 38.0 C).    Historical Provider, MD   aspirin 81 mg chewable tablet Chew 1 tablet (81 mg) once daily.    Historical Provider, MD   cholecalciferol (Vitamin D-3) 50 MCG (2000 UT) tablet Take 1 tablet (50 mcg) by mouth once daily at bedtime.    Historical Provider, MD   divalproex (Depakote) 250 mg EC tablet Take 1 tablet (250 mg) by mouth once daily at bedtime. Total of 2,250mg    Historical Provider, MD   divalproex (Depakote) 500 mg EC tablet Take 4 tablets (2,000 mg) by mouth once daily at bedtime. Total of 2,250mg    Historical Provider  MD   ketoconazole (NIZOral) 2 % cream Apply 1 Application topically 2 times a day as needed for itching or rash.    Historical Provider, MD   latanoprost (Xalatan) 0.005 % ophthalmic solution Administer 1 drop into both eyes once daily at bedtime.    Historical Provider, MD   levothyroxine (Synthroid, Levoxyl) 50 mcg tablet Take 1 tablet (50 mcg) by mouth once daily in the morning. Take before meals.    Historical Provider, MD   magnesium hydroxide (Milk of Magnesia) 400 mg/5 mL suspension Take 30 mL by mouth once daily as needed for constipation.    Historical Provider, MD   omega 3-dha-epa-fish oil (Fish OiL) 1,000 mg (120 mg-180 mg) capsule Take 1 capsule (1,000 mg) by mouth once daily at bedtime.    Historical Provider, MD   simvastatin (Zocor) 20 mg tablet Take 1 tablet (20 mg) by mouth once daily at bedtime.    Historical Provider, MD   tamsulosin (Flomax) 0.4 mg 24 hr capsule Take 1 capsule (0.4 mg) by mouth once daily at bedtime.    Historical Provider, MD       Medications  Scheduled medications  aspirin, 81 mg, oral, Daily  divalproex, 2,000 mg, oral, Nightly  divalproex, 250 mg, oral, Nightly  enoxaparin, 40 mg, subcutaneous, Daily  latanoprost, 1 drop, Both Eyes, Nightly  levothyroxine, 50 mcg, oral, Daily before breakfast  simvastatin, 20 mg, oral, Nightly  tamsulosin, 0.4 mg, oral, Nightly      Continuous medications  sodium chloride 0.9%, 85 mL/hr      PRN medications  PRN medications: acetaminophen, magnesium hydroxide, melatonin, polyethylene glycol        Assessment/Plan   Principal Problem:    Declining functional status  Active Problems:    History of stroke with residual deficit    Hyponatremia    Pleural effusion on left    Elevated AST (SGOT)    Generalized weakness    Hypothyroidism    Hypertension        Plan:  Code Status: Full Code   Consult: Neurology.  IVFs: Normal saline at 85 MLS per hour x 10 hours.  Meds: Melatonin 3 milligrams p.o. daily as needed for insomnia, MiraLAX 17 gms p.o  daily as needed for constipation.  Home meds resumed as appropriate  Avoid hepatic toxic medications.  Diet: N.p.o. except meds, SLP on consult, nursing to do bedside swallow  Telemetry monitoring.  Vital signs with BP, HR, & RR Q 4 hours.  Pulse ox Q 4 hours.   Admission height and weight.  Labs ordered: CBC, BMP, magnesium, phosphorus, hepatic panel, ammonia, TSH with reflex T4, hemoglobin A1c, lipid panel.  Test ordered: Ultrasound of BLE.  Monitor / trend labs while inpatient.  Replace electrolytes as needed.  Aspiration precautions.  Out of bed with assistance   Fall precautions  PT/OT eval and treat as indicated.  SLP eval and treat as indicated.   Seizure precautions & pads.  Call physician for temperature < 36.5 or >38.0 degrees celsius.  Call physician for pulse <50 or >120.  Call physician for respiratory rate <10 or >22.  Call physician for systolic blood pressure <90 or >170.  Call physician for diastolic blood pressure < 50 or >100.  Call physician for pulse ox <92%.  See orders for further plan of care ordered.     VTE prophylaxis:   Lovenox subcutaneous  Monitor for s/s of bleeding    Further evaluation and management per attending and consulting physicians.      This note has been transcribed using Dragon voice recognition system and there is a possibility of unintentional typing misprints.  Any information found to be copied from previous providers is done in the best interest of the patient to provide accurate, quality, and continuity of care.     I spent 55 minutes in the professional and overall care of this patient.      Linnette Hansen, APRDAVIS-CNP  [facility]

## 2024-04-03 NOTE — PROCEDURES
Speech-Language Pathology    Inpatient Modified Barium Swallow Study     Patient Name: Dimas Ann  MRN: [id number]  : 1955  Today's Date: 24  Time Calculation  Start Time: 1134  Stop Time: 1154  Time Calculation (min): 20 min       Recommendations:    DIET: Soft and bite-sized solids and mildly/nectar thick liquids BY SPOON, meds in puree (crush as needed/able)  STRATEGIES: Upright for intake, small bites, 1:1 assistance/supervision, slow rate, double/repeat swallows, STRICT oral care  EXERCISES: Lingual resistance, effortful swallow, chin tuck against resistance, supraglottic swallow, respiratory muscle strength training (RMST)      Assessment/Impression:    Patient presents with moderate oropharyngeal dysphagia upon completion of modified barium swallow study this date. Oral phase deficits were characterized by lingual pumping, decreased bolus propulsion, delayed AP transit, and prolonged/inefficient mastication. Pharyngeal phase deficits were characterized by decreased tongue base retraction, decreased laryngeal elevation, decreased hyolaryngeal excursion, incomplete laryngeal vestibule closure, and decreased pharyngeal stripping wave. Patient demonstrated deep penetration and aspiration of residue during 5mL trial of thin liquid, with more significant SILENT aspiration for 20mL presentation of thin liquid. Despite cued cough/re-swallow, patient with ongoing deep penetration of residue from thin liquid trials during mildly thick liquid trials, with additional trace penetration for large sips of mildly thick liquid. Residue remaining in laryngeal vestibule was observed to pass briefly below vocal folds during subsequent trials, with throat clear response demonstrating some intact sensation. Further aspiration of residue with cough response was observed during regular solid trial. Patient demonstrated mild-moderate oral and pharyngeal residue, and was inconsistent in eliciting repeat swallow to  "attempt to clear.    Per the results of this assessment, and given history of CVA with recent functional decline, patient is at risk for aspiration and related complications. Of note, patient is currently stable on room air and chest imaging this admission was not concerning for infection.  Recommend advancing to modified PO diet of soft and bite-sized solids and mildly thick liquids VIA SPOON, with additional strategies as noted above. ST to continue to follow during inpatient stay to ensure diet tolerance, and to provide ongoing education/training in safe swallowing strategies to reduce risk of aspiration, and to direct swallowing exercises targeting deficits identified.    Dysphagia Outcome Severity Scale Rating: 3 (Moderate)    Full, detailed SLP/Radiologist modified barium swallow study report can be found under \"Chart Review\" tab, \"Imaging tab\" and  titled \"FL modified barium swallow study\".    Plan:  Treatment/Interventions: Pharyngeal exercises, Compensatory strategy training, Patient/family education  SLP Plan: Skilled SLP warranted  SLP Frequency: 2x per week  Duration: Current admission  Discussed POC: Patient, Nursing   Discussed Risks/Benefits: Yes  Patient/Caregiver Agreeable: Yes    Subjective:  Patient was agreeable to participation, but was inconsistent in being able to follow commands.    Objective:    GOALS (established 4/3/24):  1. Patient will tolerate recommended PO diet absent of overt s/s of aspiration in 90% of observed therapeutic trials.  2. Patient will implement safe swallowing strategies to reduce risk of aspiration in 90% of trials given caregiver assistance/cueing as needed.  3. Patient will complete recommended swallowing exercises (lingual resistance, effortful swallow, chin tuck against resistance, supraglottic swallow) for at least 30 repetitions during treatment session given minimal-moderate cues.  4. Patient will complete respiratory muscle strength training (RMST) at " recommended frequency given minimal-moderate cues.      Pain:   Rating 0-10: 0      Education:   Patient educated on results of MBS study, recommended diet, and recommended safe swallow strategies. Patient verbalized understanding of education provided. Education will be reinforced.

## 2024-04-03 NOTE — PROGRESS NOTES
Speech-Language Pathology    SLP Adult Inpatient Speech-Language Pathology Clinical Swallow Evaluation    Patient Name: Dimas Ann  MRN: [id number]  Today's Date: 4/3/2024   Time Calculation  Start Time: 0952  Stop Time: 1013  Time Calculation (min): 21 min         Current Problem:   1. Declining functional status        2. Hyponatremia        3. Altered mental status, unspecified altered mental status type        4. Edema, unspecified type  Lower extremity venous duplex bilateral    Lower extremity venous duplex bilateral      5. Localized swelling, mass and lump, left lower limb  Lower extremity venous duplex bilateral    Lower extremity venous duplex bilateral      6. Localized edema  Lower extremity venous duplex bilateral            Recommendations:  Risk for Aspiration: Yes  Additional Recommendations: Modified barium swallow study, Dysphagia treatment  Solid Diet Recommendations : Other (Comment) (per MBSS)  Liquid Diet Recommendations: Other (Comments) (per MBSS)  Compensatory Swallowing Strategies: Other (Comment) (STRICT oral care)  Medication Administration Recommendations: With Pureed  Follow up treatments: Other (Comment) (per MBSS)      Assessment:  Assessment Results:   Patient presents with concern for possible oropharyngeal dysphagia upon completion of clinical swallow evaluation at bedside this date. Examination of oral mechanism was limited due to decreased ability to execute tasks upon command (apraxia suspected). [place] Swallow Protocol (3oz water challenge) was deferred due to concern for overt s/s at the single sip level. Patient demonstrated inconsistent cough and congestion/gurgling with trials of ice chips, thin liquids, mildly thick liquid, and regular solids.     Of note, patient with records from prior MBSS completed at outside facility, available for review in [facility]. Per MBSS 2/2/22, patient demonstrated mild oral and mild-moderate pharyngeal dysphagia. He was recommended regular solids  and mildly thick liquids at that time.    Per the results of this assessment, and given increased aspiration risk in the setting of prior CVA with recent functional decline, recommend MBSS for further assessment prior to recommending PO diet. ST to continue to follow during inpatient stay, and will update recommendations as indicated per MBSS.    Prognosis: Fair  Treatment Provided: No  Barriers: Cognition, Comorbidities      Plan:  Inpatient/Swing Bed or Outpatient: Inpatient  Treatment/Interventions: Complete MBSS, Diet recommendations, Patient/family education  SLP Plan: Skilled SLP  SLP Frequency: 2x per week  Duration: Current admission  SLP Discharge Recommendations: Continue skilled SLP services at the next level of care  Next Treatment Priority: MBSS  Discussed POC: Patient, Nursing, Other (Comment) (NP Reina)  Patient/Caregiver Agreeable: Yes    Goals (established 4/3/24):  Patient will complete modified barium swallow study (MBSS) for objective assessment of oropharyngeal swallow function, to assess for aspiration, and to guide further recommendations and treatment plan.      Subjective   Patient with chronic global aphasia, but did follow some simple commands and respond to some yes/no questions.      General Visit Information:  Patient Class: Inpatient  Living Environment: Assissted living/independent living  Caregiver Feedback: RN reports patient appeared to tolerate meds in applesauce earlier this date.  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, carotid artery dissection, HTN, thrombocytopenia, mild protein calorie malnutrition, and hypothyroidism  Prior Level of Function: Decreased function  Patient Seen During This Visit: Yes  Total Number of Visits : 1  Prior to Session Communication: Bedside nurse  Date of Onset: 04/02/24  Date of Order:  "24  [place] Diet: Unable to determine; no diet modification noted on transfer paperwork from [facility]  Current Diet : NPO pending results of clinical swallow evaluation  Dysphagia Diagnosis: Other (Comment) (suspected oropharyngeal dysphagia)      Objective     Relevant Imaging:    Chest XR 24: \"IMPRESSION:  Findings consistent with ongoing CHF.  Moderate-sized left pleural effusion with associated left basilar  atelectasis.\"    CT Head 24: \"IMPRESSION:  No acute intracranial bleed or focal mass effect.  Changes from large old infarct in the left MCA distribution.  Moderate volume loss.  Mild chronic white matter ischemic disease in the deep  periventricular regions.  High left paramidline parietal scalp calcified nodule. Old calcified  hematoma versus calcified sebaceous cyst.\"      Baseline Assessment:  Respiratory Status: Room air  History of Intubation: No  Behavior/Cognition: Alert, Requires cueing  Patient Positioning: Upright in Bed  Baseline Vocal Quality: Normal  Volitional Cough: Other (Comment) (did not elicit upon command)  Volitional Swallow: Other (Comment) (did not elicit upon command)      Pain:  Pain Assessment: 0-10  Pain Score: 0 - No pain       Oral/Motor Assessment:  Oral Hygiene: Solid debris removed with oral care  Dentition: Adequate/Natural  Oral Motor: Impaired Function (limited, suspect oral apraxia)  Apraxia: Oral, Verbal  Intelligibility: Intelligibility reduced  Intelligibility Ratin%-50%      Consistencies Trialed:  Consistencies Trialed: Yes  Consistencies Trialed: Ice Chips, Thin (IDDSI Level 0) - Cup, Thin (IDDSI Level 0) - Straw, Pureed/extremely thick (IDDSI Level 4), Soft & bite sized/chopped (IDDSI Level 6), Regular (IDDSI Level 7), Nectar thick/mildly thick (IDDSI Level 2) - Cup, Nectar thick/mildly thick (IDDSI Level 2) - Straw      Clinical Observations:  Patient Positioning: Upright in Bed  Was The 3 oz Swallow Protocol Completed: Other (Comment) (deferred due " to overt s/s of aspiration with single sips)  Signs/Symptoms of Aspiration: Cough, Increased Congestion, Wet Vocal Quality  Overt Signs or Symptoms of Aspiration: Thin (IDDSI Level 0) - Cup, Thin (IDDSI Level 0) - Straw, Nectar Thick/Mildly Thick (IDDSI Level 2) - Cup, Nectar Thick/Mildly Thick (IDDSI Level 2) - Straw, Regular (IDDSI Level 7)  Anterior Spillage: Nectar Thick/Mildly Thick (IDDSI Level 2) - Cup  Prolonged Oral Manipulation: Soft & Bite Sized/Chopped (IDDSI Level 6), Regular (IDDSI Level 7)  Oral Residue: Soft & Bite Sized/Chopped (IDDSI Level 6)      Inpatient:    Education:  Learner patient   Barriers to Learning cognitive limitations barrier; communication limitations barrier   Method demonstration; verbal   Education - Topic ST provided patient education regarding role of ST, purpose of assessment, clinical impressions, goals of treatment, and plan of care. Patient verbalized questionable full comprehension, consistent with cognitive status. Education will be reinforced. ST further coordinated with RN regarding recommendations and precautions per this assessment, with RN verbalizing understanding.     Outcome    Verbalized understanding and agreement; needs review/reinforcement

## 2024-04-04 ENCOUNTER — APPOINTMENT (OUTPATIENT)
Dept: RADIOLOGY | Facility: HOSPITAL | Age: 69
DRG: 070 | End: 2024-04-04
Payer: MEDICARE

## 2024-04-04 ENCOUNTER — HOSPITAL ENCOUNTER (INPATIENT)
Dept: NEUROLOGY | Facility: HOSPITAL | Age: 69
Discharge: HOME | DRG: 070 | End: 2024-04-04
Payer: MEDICARE

## 2024-04-04 LAB
ANION GAP SERPL CALC-SCNC: 9 MMOL/L (ref 10–20)
BUN SERPL-MCNC: 24 MG/DL (ref 6–23)
CALCIUM SERPL-MCNC: 8.8 MG/DL (ref 8.6–10.3)
CHLORIDE SERPL-SCNC: 95 MMOL/L (ref 98–107)
CO2 SERPL-SCNC: 32 MMOL/L (ref 21–32)
CREAT SERPL-MCNC: 0.53 MG/DL (ref 0.5–1.3)
EGFRCR SERPLBLD CKD-EPI 2021: >90 ML/MIN/1.73M*2
ERYTHROCYTE [DISTWIDTH] IN BLOOD BY AUTOMATED COUNT: 13.1 % (ref 11.5–14.5)
GLUCOSE SERPL-MCNC: 107 MG/DL (ref 74–99)
HCT VFR BLD AUTO: 38.5 % (ref 41–52)
HGB BLD-MCNC: 13 G/DL (ref 13.5–17.5)
MAGNESIUM SERPL-MCNC: 2.16 MG/DL (ref 1.6–2.4)
MCH RBC QN AUTO: 32.8 PG (ref 26–34)
MCHC RBC AUTO-ENTMCNC: 33.8 G/DL (ref 32–36)
MCV RBC AUTO: 97 FL (ref 80–100)
NRBC BLD-RTO: 0 /100 WBCS (ref 0–0)
PLATELET # BLD AUTO: 167 X10*3/UL (ref 150–450)
POTASSIUM SERPL-SCNC: 3.7 MMOL/L (ref 3.5–5.3)
RBC # BLD AUTO: 3.96 X10*6/UL (ref 4.5–5.9)
SODIUM SERPL-SCNC: 132 MMOL/L (ref 136–145)
WBC # BLD AUTO: 8.7 X10*3/UL (ref 4.4–11.3)

## 2024-04-04 PROCEDURE — 85027 COMPLETE CBC AUTOMATED: CPT | Performed by: NURSE PRACTITIONER

## 2024-04-04 PROCEDURE — 2500000001 HC RX 250 WO HCPCS SELF ADMINISTERED DRUGS (ALT 637 FOR MEDICARE OP): Performed by: NURSE PRACTITIONER

## 2024-04-04 PROCEDURE — 99232 SBSQ HOSP IP/OBS MODERATE 35: CPT

## 2024-04-04 PROCEDURE — 95816 EEG AWAKE AND DROWSY: CPT

## 2024-04-04 PROCEDURE — 97166 OT EVAL MOD COMPLEX 45 MIN: CPT | Mod: GO

## 2024-04-04 PROCEDURE — 2500000004 HC RX 250 GENERAL PHARMACY W/ HCPCS (ALT 636 FOR OP/ED): Performed by: NURSE PRACTITIONER

## 2024-04-04 PROCEDURE — 92526 ORAL FUNCTION THERAPY: CPT | Mod: GN | Performed by: SPEECH-LANGUAGE PATHOLOGIST

## 2024-04-04 PROCEDURE — 80048 BASIC METABOLIC PNL TOTAL CA: CPT | Performed by: NURSE PRACTITIONER

## 2024-04-04 PROCEDURE — 71045 X-RAY EXAM CHEST 1 VIEW: CPT

## 2024-04-04 PROCEDURE — 97112 NEUROMUSCULAR REEDUCATION: CPT | Mod: GP

## 2024-04-04 PROCEDURE — 83735 ASSAY OF MAGNESIUM: CPT | Performed by: NURSE PRACTITIONER

## 2024-04-04 PROCEDURE — 2500000002 HC RX 250 W HCPCS SELF ADMINISTERED DRUGS (ALT 637 FOR MEDICARE OP, ALT 636 FOR OP/ED): Performed by: NURSE PRACTITIONER

## 2024-04-04 PROCEDURE — 36415 COLL VENOUS BLD VENIPUNCTURE: CPT | Performed by: NURSE PRACTITIONER

## 2024-04-04 PROCEDURE — 71045 X-RAY EXAM CHEST 1 VIEW: CPT | Performed by: RADIOLOGY

## 2024-04-04 PROCEDURE — 2500000004 HC RX 250 GENERAL PHARMACY W/ HCPCS (ALT 636 FOR OP/ED)

## 2024-04-04 PROCEDURE — 95816 EEG AWAKE AND DROWSY: CPT | Performed by: PSYCHIATRY & NEUROLOGY

## 2024-04-04 PROCEDURE — 1200000002 HC GENERAL ROOM WITH TELEMETRY DAILY

## 2024-04-04 PROCEDURE — 97161 PT EVAL LOW COMPLEX 20 MIN: CPT | Mod: GP

## 2024-04-04 RX ORDER — ONDANSETRON HYDROCHLORIDE 2 MG/ML
4 INJECTION, SOLUTION INTRAVENOUS ONCE
Status: COMPLETED | OUTPATIENT
Start: 2024-04-04 | End: 2024-04-04

## 2024-04-04 RX ORDER — FUROSEMIDE 10 MG/ML
20 INJECTION INTRAMUSCULAR; INTRAVENOUS ONCE
Status: COMPLETED | OUTPATIENT
Start: 2024-04-04 | End: 2024-04-04

## 2024-04-04 RX ORDER — LORAZEPAM 2 MG/ML
1 INJECTION INTRAMUSCULAR ONCE
Status: COMPLETED | OUTPATIENT
Start: 2024-04-04 | End: 2024-04-04

## 2024-04-04 RX ADMIN — FUROSEMIDE 20 MG: 10 INJECTION, SOLUTION INTRAMUSCULAR; INTRAVENOUS at 14:02

## 2024-04-04 RX ADMIN — DIVALPROEX SODIUM 250 MG: 500 TABLET, DELAYED RELEASE ORAL at 20:18

## 2024-04-04 RX ADMIN — Medication 3 MG: at 20:46

## 2024-04-04 RX ADMIN — LATANOPROST 1 DROP: 50 SOLUTION OPHTHALMIC at 20:18

## 2024-04-04 RX ADMIN — TAMSULOSIN HYDROCHLORIDE 0.4 MG: 0.4 CAPSULE ORAL at 20:18

## 2024-04-04 RX ADMIN — DIVALPROEX SODIUM 2000 MG: 500 TABLET, DELAYED RELEASE ORAL at 20:16

## 2024-04-04 RX ADMIN — ENOXAPARIN SODIUM 40 MG: 40 INJECTION SUBCUTANEOUS at 08:44

## 2024-04-04 RX ADMIN — LORAZEPAM 1 MG: 2 INJECTION, SOLUTION INTRAMUSCULAR; INTRAVENOUS at 20:46

## 2024-04-04 RX ADMIN — ASPIRIN 81 MG 81 MG: 81 TABLET ORAL at 08:44

## 2024-04-04 RX ADMIN — SIMVASTATIN 20 MG: 20 TABLET, FILM COATED ORAL at 20:17

## 2024-04-04 RX ADMIN — ONDANSETRON 4 MG: 2 INJECTION INTRAMUSCULAR; INTRAVENOUS at 20:43

## 2024-04-04 ASSESSMENT — COGNITIVE AND FUNCTIONAL STATUS - GENERAL
TOILETING: A LOT
DAILY ACTIVITIY SCORE: 12
DRESSING REGULAR LOWER BODY CLOTHING: A LOT
CLIMB 3 TO 5 STEPS WITH RAILING: TOTAL
MOVING TO AND FROM BED TO CHAIR: A LOT
EATING MEALS: A LOT
DAILY ACTIVITIY SCORE: 12
TURNING FROM BACK TO SIDE WHILE IN FLAT BAD: A LOT
PERSONAL GROOMING: A LOT
PERSONAL GROOMING: A LOT
WALKING IN HOSPITAL ROOM: TOTAL
DRESSING REGULAR UPPER BODY CLOTHING: A LOT
STANDING UP FROM CHAIR USING ARMS: A LOT
MOVING TO AND FROM BED TO CHAIR: A LOT
DRESSING REGULAR LOWER BODY CLOTHING: A LOT
DRESSING REGULAR UPPER BODY CLOTHING: A LOT
MOVING FROM LYING ON BACK TO SITTING ON SIDE OF FLAT BED WITH BEDRAILS: A LOT
HELP NEEDED FOR BATHING: A LOT
HELP NEEDED FOR BATHING: A LOT
MOBILITY SCORE: 10
TOILETING: A LOT
TURNING FROM BACK TO SIDE WHILE IN FLAT BAD: A LOT
EATING MEALS: A LOT
CLIMB 3 TO 5 STEPS WITH RAILING: TOTAL
WALKING IN HOSPITAL ROOM: TOTAL
MOVING FROM LYING ON BACK TO SITTING ON SIDE OF FLAT BED WITH BEDRAILS: A LOT
STANDING UP FROM CHAIR USING ARMS: A LOT
MOBILITY SCORE: 10

## 2024-04-04 ASSESSMENT — PAIN SCALES - GENERAL
PAINLEVEL_OUTOF10: 0 - NO PAIN

## 2024-04-04 ASSESSMENT — PAIN - FUNCTIONAL ASSESSMENT
PAIN_FUNCTIONAL_ASSESSMENT: 0-10
PAIN_FUNCTIONAL_ASSESSMENT: 0-10

## 2024-04-04 ASSESSMENT — ACTIVITIES OF DAILY LIVING (ADL): BATHING_ASSISTANCE: MAXIMAL

## 2024-04-04 NOTE — CARE PLAN
Problem: Pain  Goal: My pain/discomfort is manageable  4/3/2024 2020 by Traci Gunderson RN  Outcome: Not Progressing  4/3/2024 2018 by Traci Gunderson RN  Outcome: Not Progressing     Problem: Skin  Goal: Participates in plan/prevention/treatment measures  4/3/2024 2020 by Traci Gunderson RN  Outcome: Not Progressing  4/3/2024 2018 by Traci Gunderson RN  Outcome: Not Progressing  Goal: Prevent/manage excess moisture  4/3/2024 2020 by Traci Gunderson RN  Outcome: Not Progressing  4/3/2024 2018 by Traci Gunderson RN  Outcome: Not Progressing  Goal: Prevent/minimize sheer/friction injuries  4/3/2024 2020 by Traci Gunderson RN  Outcome: Not Progressing  Flowsheets (Taken 4/3/2024 2020)  Prevent/minimize sheer/friction injuries: Increase activity/out of bed for meals  4/3/2024 2018 by Traci Gunderson RN  Outcome: Not Progressing  Goal: Promote/optimize nutrition  4/3/2024 2020 by Traci Gunderson RN  Outcome: Not Progressing  4/3/2024 2018 by Traci Gunderson RN  Outcome: Not Progressing  Goal: Promote skin healing  4/3/2024 2020 by Traci Gunderson RN  Outcome: Not Progressing  4/3/2024 2018 by Traci Gunderson RN  Outcome: Not Progressing   The patient's goals for the shift include      The clinical goals for the shift include not to fall    Over the shift, the patient did not make progress toward the following goals. Barriers to progression include . Recommendations to address these barriers include .    Problem: Pain  Goal: My pain/discomfort is manageable  4/3/2024 2020 by Traci Gunderson RN  Outcome: Not Progressing  4/3/2024 2018 by Traci Gunderson RN  Outcome: Not Progressing     Problem: Safety  Goal: Patient will be injury free during hospitalization  4/3/2024 2020 by Traci Gunderson RN  Outcome: Not Progressing  4/3/2024 2018 by Traci Gunderson RN  Outcome: Not Progressing  Goal: I will remain free of falls  4/3/2024 2020 by  Traci Gunderson RN  Outcome: Not Progressing  4/3/2024 2018 by Traci Gunderson RN  Outcome: Not Progressing     Problem: Daily Care  Goal: Daily care needs are met  4/3/2024 2020 by Traci Gunderson RN  Outcome: Not Progressing  4/3/2024 2018 by Traci Gunderson RN  Outcome: Not Progressing     Problem: Psychosocial Needs  Goal: Demonstrates ability to cope with hospitalization/illness  4/3/2024 2020 by Traci Gunderson RN  Outcome: Not Progressing  4/3/2024 2018 by Traci Gunderson RN  Outcome: Not Progressing  Goal: Collaborate with me, my family, and caregiver to identify my specific goals  4/3/2024 2020 by Traci Gunderson RN  Outcome: Not Progressing  4/3/2024 2018 by Traci Gunderson RN  Outcome: Not Progressing     Problem: Discharge Barriers  Goal: My discharge needs are met  4/3/2024 2020 by Traci Gunderson RN  Outcome: Not Progressing  4/3/2024 2018 by Traci Gunderson RN  Outcome: Not Progressing     Problem: Skin  Goal: Participates in plan/prevention/treatment measures  4/3/2024 2020 by Traci Gunderson RN  Outcome: Not Progressing  4/3/2024 2018 by Traci Gunderson RN  Outcome: Not Progressing  Goal: Prevent/manage excess moisture  4/3/2024 2020 by Traci Gunderson RN  Outcome: Not Progressing  4/3/2024 2018 by Traci Gunderson RN  Outcome: Not Progressing  Goal: Prevent/minimize sheer/friction injuries  4/3/2024 2020 by Traci Gunderson RN  Outcome: Not Progressing  Flowsheets (Taken 4/3/2024 2020)  Prevent/minimize sheer/friction injuries: Increase activity/out of bed for meals  4/3/2024 2018 by Traci Gunderson RN  Outcome: Not Progressing  Goal: Promote/optimize nutrition  4/3/2024 2020 by Traci Gunderson RN  Outcome: Not Progressing  4/3/2024 2018 by Traci Gunderson RN  Outcome: Not Progressing  Goal: Promote skin healing  4/3/2024 2020 by Heatheranne Jalyn, RN  Outcome: Not Progressing  4/3/2024 2018 by Traci  GAY Gunderson  Outcome: Not Progressing     Problem: Fall/Injury  Goal: Not fall by end of shift  4/3/2024 2020 by Traci Gunderson RN  Outcome: Not Progressing  4/3/2024 2018 by Traci Gunderson RN  Outcome: Not Progressing  Goal: Be free from injury by end of the shift  4/3/2024 2020 by Traci Gunderson RN  Outcome: Not Progressing  4/3/2024 2018 by Traci Gunderson RN  Outcome: Not Progressing  Goal: Verbalize understanding of personal risk factors for fall in the hospital  4/3/2024 2020 by Traci Gunderson RN  Outcome: Not Progressing  4/3/2024 2018 by Traci Gunderson RN  Outcome: Not Progressing  Goal: Verbalize understanding of risk factor reduction measures to prevent injury from fall in the home  4/3/2024 2020 by Traci Gunderson RN  Outcome: Not Progressing  4/3/2024 2018 by Traci Gunderson RN  Outcome: Not Progressing  Goal: Use assistive devices by end of the shift  4/3/2024 2020 by Traci Gunderson RN  Outcome: Not Progressing  4/3/2024 2018 by Traci Gunderson RN  Outcome: Not Progressing  Goal: Pace activities to prevent fatigue by end of the shift  4/3/2024 2020 by Traci Gunderson RN  Outcome: Not Progressing  4/3/2024 2018 by Traci Gunderson RN  Outcome: Not Progressing     Problem: Pain - Adult  Goal: Verbalizes/displays adequate comfort level or baseline comfort level  4/3/2024 2020 by Traci Gunderson RN  Outcome: Not Progressing  4/3/2024 2018 by Traci Gunderson RN  Outcome: Not Progressing     Problem: Safety - Adult  Goal: Free from fall injury  4/3/2024 2020 by Traci Gunderson RN  Outcome: Not Progressing  4/3/2024 2018 by Traci Gunderson RN  Outcome: Not Progressing     Problem: Discharge Planning  Goal: Discharge to home or other facility with appropriate resources  4/3/2024 2020 by Traci Gunderson RN  Outcome: Not Progressing  4/3/2024 2018 by Heatheranne Jalyn, RN  Outcome: Not Progressing     Problem: Chronic  Conditions and Co-morbidities  Goal: Patient's chronic conditions and co-morbidity symptoms are monitored and maintained or improved  4/3/2024 2020 by Traci Gunderson RN  Outcome: Not Progressing  4/3/2024 2018 by Traci Gunderson RN  Outcome: Not Progressing     Problem: General Stroke  Goal: Establish a mutual long term goal with patient by discharge  4/3/2024 2020 by Traci Gunderson RN  Outcome: Not Progressing  4/3/2024 2018 by Traci Gunderson RN  Outcome: Not Progressing  Goal: Demonstrate improvement in neurological exam throughout the shift  4/3/2024 2020 by Traci Gunderson RN  Outcome: Not Progressing  4/3/2024 2018 by Traci Gunderson RN  Outcome: Not Progressing  Goal: Maintain BP within ordered limits throughout shift  4/3/2024 2020 by Traci Gunderson RN  Outcome: Not Progressing  4/3/2024 2018 by Traci Gunderson RN  Outcome: Not Progressing  Goal: Participate in treatment (ie., meds, therapy) throughout shift  4/3/2024 2020 by Traci Gunderson RN  Outcome: Not Progressing  4/3/2024 2018 by Traci Gunderson RN  Outcome: Not Progressing  Goal: No symptoms of aspiration throughout shift  4/3/2024 2020 by Traci Gunderson RN  Outcome: Not Progressing  4/3/2024 2018 by Traci Gunderson RN  Outcome: Not Progressing  Goal: No symptoms of hemorrhage throughout shift  4/3/2024 2020 by Traci Gunderson RN  Outcome: Not Progressing  4/3/2024 2018 by Traci Gunderson RN  Outcome: Not Progressing

## 2024-04-04 NOTE — CARE PLAN
The patient's goals for the shift include      The clinical goals for the shift include pt will be free of further neurological deficits this shift    Over the shift, the patient did not make progress toward the following goals. Barriers to progression include . Recommendations to address these barriers include .    Problem: Pain  Goal: My pain/discomfort is manageable  Outcome: Not Progressing     Problem: Safety  Goal: Patient will be injury free during hospitalization  Outcome: Not Progressing  Goal: I will remain free of falls  Outcome: Not Progressing     Problem: Daily Care  Goal: Daily care needs are met  Outcome: Not Progressing     Problem: Psychosocial Needs  Goal: Demonstrates ability to cope with hospitalization/illness  Outcome: Not Progressing  Goal: Collaborate with me, my family, and caregiver to identify my specific goals  Outcome: Not Progressing     Problem: Discharge Barriers  Goal: My discharge needs are met  Outcome: Not Progressing     Problem: Skin  Goal: Participates in plan/prevention/treatment measures  Outcome: Not Progressing  Goal: Prevent/manage excess moisture  Outcome: Not Progressing  Goal: Prevent/minimize sheer/friction injuries  Outcome: Not Progressing  Goal: Promote/optimize nutrition  Outcome: Not Progressing  Goal: Promote skin healing  Outcome: Not Progressing     Problem: Fall/Injury  Goal: Not fall by end of shift  Outcome: Not Progressing  Goal: Be free from injury by end of the shift  Outcome: Not Progressing  Goal: Verbalize understanding of personal risk factors for fall in the hospital  Outcome: Not Progressing  Goal: Verbalize understanding of risk factor reduction measures to prevent injury from fall in the home  Outcome: Not Progressing  Goal: Use assistive devices by end of the shift  Outcome: Not Progressing  Goal: Pace activities to prevent fatigue by end of the shift  Outcome: Not Progressing     Problem: Pain - Adult  Goal: Verbalizes/displays adequate comfort  level or baseline comfort level  Outcome: Not Progressing     Problem: Safety - Adult  Goal: Free from fall injury  Outcome: Not Progressing     Problem: Discharge Planning  Goal: Discharge to home or other facility with appropriate resources  Outcome: Not Progressing     Problem: Chronic Conditions and Co-morbidities  Goal: Patient's chronic conditions and co-morbidity symptoms are monitored and maintained or improved  Outcome: Not Progressing     Problem: General Stroke  Goal: Establish a mutual long term goal with patient by discharge  Outcome: Not Progressing  Goal: Demonstrate improvement in neurological exam throughout the shift  Outcome: Not Progressing  Goal: Maintain BP within ordered limits throughout shift  Outcome: Not Progressing  Goal: Participate in treatment (ie., meds, therapy) throughout shift  Outcome: Not Progressing  Goal: No symptoms of aspiration throughout shift  Outcome: Not Progressing  Goal: No symptoms of hemorrhage throughout shift  Outcome: Not Progressing

## 2024-04-04 NOTE — PROGRESS NOTES
04/04/24 1014   Discharge Planning   Assistance Needed yes   Patient expects to be discharged to: SNF referrals sent     Spoke with patient's brother at the bedside: Referrals sent: 1. [facility], 2. [facility], 3. [facility] 4. [facility].  Waiting on therapy evaluation notes.

## 2024-04-04 NOTE — PROGRESS NOTES
"Nutrition Initial Assessment:   Nutrition Assessment    Reason for Assessment: Dietitian discretion (Auto SLP on consult w/recomendation for NTL)    Patient is a 69 y.o. male presenting from [facility] for declining functional status. SLP on consult w/recommendation for bite sized foods and NTL. Neurology on consult.       Past Medical History   has a past medical history of Aphasia due to acute stroke (CMS/[facility]), Bradycardia (02/10/2022), Carotid artery dissection (CMS/[facility]), Cellulitis, Hemiplegia (CMS/[facility]), History of stroke with residual deficit (02/10/2022), Hypertension, Hypothermia due to exposure (02/10/2022), Hypothyroidism, Mild protein-calorie malnutrition (CMS/[facility]) (02/10/2022), Non-traumatic rhabdomyolysis (02/10/2022), Seizure (CMS/[facility]), Stroke (CMS/[facility]), and Thrombocytopenia (CMS/[facility]) (02/10/2022).  Surgical History   has no past surgical history on file.    Nutrition History:  Food and Nutrient History: Pt w/meals provided by [facility] facility. Unable to reach staff at [facility] to verify diet PTA.  Vitamin/Herbal Supplement Use: unknown  Food Allergies/Intolerances:  None  GI Symptoms: None  Oral Problems: Swallowing difficulty       Anthropometrics:  Height: 177.8 cm (5' 10\")   Weight: 83.9 kg (185 lb)   BMI (Calculated): 26.54             Weight History:   Wt Readings from Last 22 Encounters:   04/02/24 83.9 kg (185 lb)       Weight Change %:  Weight History / % Weight Change: per archives 2/17/22 180#, 2/10/24 170#  Significant Weight Loss: No    Nutrition Focused Physical Exam Findings:    Subcutaneous Fat Loss:   Orbital Fat Pads: Mild-Moderate (slight dark circles and slight hollowing)  Buccal Fat Pads: Mild-Moderate (flat cheeks, minimal bounce)  Muscle Wasting:  Temporalis: Mild-Moderate (slight depression)  Pectoralis (Clavicular Region): Mild-Moderate (some protrusion of clavicle)  Quadriceps: Well nourished (well developed, well rounded)  Gastrocnemius: Well nourished (well developed " "bulbous muscle)  Edema:  Edema: +2 mild  Edema Location: BLE  Physical Findings:  Skin:  (pale, warm, dry, .bruising)    Nutrition Significant Labs:  CBC Trend:   Results from last 7 days   Lab Units 04/03/24  0803 04/02/24  0957   WBC AUTO x10*3/uL 7.0 8.7   RBC AUTO x10*6/uL 3.84* 3.94*   HEMOGLOBIN g/dL 12.6* 12.8*   HEMATOCRIT % 38.2* 38.7*   MCV fL 100 98   PLATELETS AUTO x10*3/uL 145* 151    , BMP Trend:   Results from last 7 days   Lab Units 04/03/24  0603 04/02/24  1532 04/02/24  0957   GLUCOSE mg/dL 88 99 97  97   CALCIUM mg/dL 8.7 8.9 8.5*  8.4*   SODIUM mmol/L 132* 132* 130*  128*   POTASSIUM mmol/L 3.6 3.6 5.9*  5.8*   CO2 mmol/L 27 30 28  30   CHLORIDE mmol/L 95* 94* 94*  93*   BUN mg/dL 26* 24* 24*  24*   CREATININE mg/dL 0.56 0.60 0.58  0.60    , A1C:  Lab Results   Component Value Date    HGBA1C 5.1 04/03/2024   , BG POCT trend:   Results from last 7 days   Lab Units 04/02/24  0853   POCT GLUCOSE mg/dL 89    , Liver Function Trend:   Results from last 7 days   Lab Units 04/03/24  0603 04/02/24  0957   ALK PHOS U/L 75 79   AST U/L 17 44*   ALT U/L 9* 12   BILIRUBIN TOTAL mg/dL 0.8 0.7    , Renal Lab Trend:   Results from last 7 days   Lab Units 04/03/24  0603 04/02/24  1532 04/02/24  0957   POTASSIUM mmol/L 3.6 3.6 5.9*  5.8*   PHOSPHORUS mg/dL 2.9  --   --    SODIUM mmol/L 132* 132* 130*  128*   MAGNESIUM mg/dL 2.09  --   --    EGFR mL/min/1.73m*2 >90 >90 >90  >90   BUN mg/dL 26* 24* 24*  24*   CREATININE mg/dL 0.56 0.60 0.58  0.60    , Lipid Panel:   Lab Results   Component Value Date    CHOL 144 04/03/2024    HDL 38.0 04/03/2024    CHHDL 3.8 04/03/2024    VLDL 33 04/03/2024    TRIG 165 (H) 04/03/2024    , Vit D: No results found for: \"VITD25\" , Vit B12:   Lab Results   Component Value Date    [id number] 989 (H) 04/02/2024    , Iron Panel: No results found for: \"IRON\", \"TIBC\", \"FERRITIN\"     Nutrition Specific Medications:  Scheduled medications  aspirin, 81 mg, oral, Daily  divalproex, " 2,000 mg, oral, Nightly  divalproex, 250 mg, oral, Nightly  enoxaparin, 40 mg, subcutaneous, Daily  latanoprost, 1 drop, Both Eyes, Nightly  levothyroxine, 50 mcg, oral, Daily before breakfast  simvastatin, 20 mg, oral, Nightly  tamsulosin, 0.4 mg, oral, Nightly      Continuous medications  lactated Ringer's, 75 mL/hr, Last Rate: 75 mL/hr (04/03/24 1855)      PRN medications  PRN medications: acetaminophen, magnesium hydroxide, melatonin, polyethylene glycol    Pain Score: 0 - No pain     I/O:    ;      Dietary Orders (From admission, onward)       Start     Ordered    04/03/24 1641  Oral nutritional supplements  Until discontinued        Comments: 4 oz TID w/meals.   Question Answer Comment   Deliver with All meals    Select supplement: Boost [facility]        04/03/24 1640    04/03/24 1641  Oral nutritional supplements  Until discontinued        Question Answer Comment   Deliver with Lunch    Select supplement: Magic Cup        04/03/24 1640    04/03/24 1438  May Not Participate in Room Service  Once        Question:  .  Answer:  Yes    04/03/24 1437    04/03/24 1425  Adult diet Cardiac; 70 gm fat; 2 - 3 grams Sodium; Bite size food 6; Mild thick 2; 1:1 Feeding  Diet effective now        Comments: LIQUIDS BY SPOON, meds in puree (crush as needed/able)    STRATEGIES: Upright for intake, small bites, 1:1 assistance/supervision, slow rate, double/repeat swallows, STRICT oral care   Question Answer Comment   Diet type Cardiac    Fat restriction: 70 gm fat    Sodium restriction: 2 - 3 grams Sodium    Texture Bite size food 6    Fluid consistency Mild thick 2    Select tray type: 1:1 Feeding        04/03/24 1424                     Estimated Needs:   Total Energy Estimated Needs (kCal):  (1700- 2000 (20-24kcal/kg))     Total Protein Estimated Needs (g):  ( (1.0-1.2g/kg))     Total Fluid Estimated Needs (mL):  (1mL/kcal)           Nutrition Diagnosis   Malnutrition Diagnosis  Patient has Malnutrition Diagnosis:  Yes  Diagnosis Status: New  Malnutrition Diagnosis: Moderate malnutrition related to chronic disease or condition  As Evidenced by: moderate muscle wasting and moderate subcutaneous fat loss.    Nutrition Diagnosis  Patient has Nutrition Diagnosis: Yes  Diagnosis Status (1): New  Nutrition Diagnosis 1: Swallowing difficulity  Related to (1): dysphagia  As Evidenced by (1): SLP recommendation for NTL.       Nutrition Interventions/Recommendations         Nutrition Prescription:  Individualized Nutrition Prescription Provided for : Diet: continue with diet as ordered. Adult diet Cardiac; 70 gm fat; 2 - 3 grams Sodium; Bite size food 6; Mild thick 2; 1:1 Feeding.        Nutrition Interventions:   Interventions: Meals and snacks, Medical food supplement  Meals and Snacks: Fat-modified diet, Mineral-modified diet, Texture-modified diet  Goal: consume 75% of meals and beverages.  Medical Food Supplement: Commercial beverage  Goal: will provide Boost [facility] 4 oz TID w/meals (265kcal, 11g pro per 4oz serving) and Magic Cups (290kcal, 9g pro per 4 oz ) at lunch.         Nutrition Education:   Encouraged PO intakes, fluid intake and ONS intakes.       Nutrition Monitoring and Evaluation   Food/Nutrient Related History Monitoring  Monitoring and Evaluation Plan: Energy intake  Energy Intake: Estimated energy intake  Criteria: will meet 75% of estimated energy needs.    Body Composition/Growth/Weight History  Monitoring and Evaluation Plan: Weight change, Weight  Weight Change: Weight change intent  Criteria: reduce wt d/t edema    Biochemical Data, Medical Tests and Procedures  Monitoring and Evaluation Plan: Electrolyte/renal panel  Electrolyte and Renal Panel: Sodium, Potassium, Phosphorus, Magnesium  Criteria: WNR            Time Spent/Follow-up Reminder:   Time Spent (min): 60 minutes  Last Date of Nutrition Visit: 04/03/24  Nutrition Follow-Up Needed?: 3-8 days  Follow up Comment: ELVIS

## 2024-04-04 NOTE — PROGRESS NOTES
Speech-Language Pathology    SLP Adult Inpatient  Speech-Language Pathology Treatment     Patient Name: Dimas Ann  MRN: [id number]  Today's Date: 4/4/2024  Time Calculation  Start Time: 1140  Stop Time: 1155  Time Calculation (min): 15 min         Current Problem:   1. Declining functional status        2. Hyponatremia        3. Altered mental status, unspecified altered mental status type        4. Edema, unspecified type  Lower extremity venous duplex bilateral    Lower extremity venous duplex bilateral      5. Localized swelling, mass and lump, left lower limb  Lower extremity venous duplex bilateral    Lower extremity venous duplex bilateral      6. Localized edema  Lower extremity venous duplex bilateral            SLP Assessment:  SLP TX Intervention Outcome: Decline in Function  SLP Assessment Results: Other (Comment) (oropharyngeal dysphagia)  Prognosis: Fair  Treatment Provided: Yes.  ST re-consulted this date due to concerns for patient tolerating PO diet as recommended per MBSS. Upon approach, PCA was with patient and taking his vitals, and notified RN of O2 decreased down to 88-89%. Nasal cannula applied. Patient also with audible gurgling/congestion this date. Patient was fully cooperative with oral care, and tolerated PO trials of mildly thick liquids and puree via spoon in the context of effortful swallow exercise; however, given concern for new O2 need and increased congestion, recommend patient to be NPO at this time, pending re-assessment. ST to follow.  Treatment Tolerance: Patient tolerated treatment well  Barriers: Cognition, Comorbidities  Education Provided: Yes       Plan:  Inpatient/Swing Bed or Outpatient: Inpatient  Treatment/Interventions: Other (Comment) (Dysphagia management)  SLP TX Plan: Continue Plan of Care  SLP Plan: Skilled SLP  SLP Frequency: 3x per week  Duration: Current admission  SLP Discharge Recommendations: Continue skilled SLP services at the next level of care  Next  Treatment Priority: Re-assess on 4/5  Discussed POC: Patient, Nursing  Patient/Caregiver Agreeable: Yes      Subjective   Patient was alert and cooperative.     Most Recent Visit:  SLP Received On: 04/04/24    General Visit Information:   Reason for Referral: Swallow evaluation  Referred By: ANNA Duarte  Past Medical History Relevant to Rehab: CVA with R tres, aphasia, cellulitis, seizures, HTN  Patient Seen During This Visit: Yes  Caregiver Feedback: RN reports patient having increased difficulty swallowing, including notably increased coughing for meds given crushed in applesauce. Patient also noted to be satting 88-90% on room air, and RN applied 2L O2 via nasal cannula prior to session.  Total Number of Visits : 3  Prior to Session Communication: Bedside nurse      Pain Assessment:   Pain Assessment: 0-10  Pain Score: 0 - No pain      Objective   GOALS (established 4/3/24):  1. Patient will tolerate recommended PO diet absent of overt s/s of aspiration in 90% of observed therapeutic trials.  Status: Ongoing  Progress: Patient tolerated therapeutic PO trials with SLP; however, there are concerns regarding patient's tolerance of previously recommended diet, including new O2 demand and increased congestion. Will continue to assess.    2. Patient will implement safe swallowing strategies to reduce risk of aspiration in 90% of trials given caregiver assistance/cueing as needed.  Status: Progressing  Progress: Patient implemented safe swallowing strategies given full caregiver assistance this date    3. Patient will complete recommended swallowing exercises (lingual resistance, effortful swallow, chin tuck against resistance, supraglottic swallow) for at least 30 repetitions during treatment session given minimal-moderate cues.  Status: Progressing  Progress: Patient completed effortful swallow x12 this date.    4. Patient will complete respiratory muscle strength training (RMST) at recommended  frequency given minimal-moderate cues.  Status: Ongoing   Progress: Not targeted    Therapeutic Swallow:  Therapeutic Swallow Intervention : PO Trials, Caregiver Education, Pharyngeal Strengthening Techniques, Compensatory Strategies  Pharyngeal Strengthening Techniques: Effortful Swallow  Solid Diet Recommendations: NPO  Liquid Diet Recommendations: NPO  Swallow Comments: May have meds crushed in puree, if unable to provide via alternate meals. Will plan to re-assess on Fr 4/5.      Inpatient:  Learner patient   Barriers to Learning cognitive limitations barrier; communication limitations barrier   Method demonstration; verbal   Education - Topic ST provided patient education regarding role of ST, purpose of assessment, clinical impressions, goals of treatment, and plan of care. Patient verbalized questionable full comprehension, consistent with cognitive status. Education will be reinforced. ST further coordinated with RN regarding recommendations and precautions per this assessment, with RN verbalizing understanding.      Outcome    Verbalized understanding and agreement; needs review/reinforcement

## 2024-04-04 NOTE — PROGRESS NOTES
Occupational Therapy    Evaluation    Patient Name: Dimas Ann  MRN: [id number]  Today's Date: 4/4/2024       Assessment  IP OT Assessment  OT Assessment: Patient would benefit from additional skilled rehab at a moderate intensity to maximize independence in adls, activity tolerance for adls, and functional mobility tasks for adls.  Prognosis: Good  Evaluation/Treatment Tolerance: Patient tolerated treatment well  End of Session Communication: Bedside nurse  End of Session Patient Position: Bed, 4 rail up, Alarm on  Plan:  Treatment Interventions: ADL retraining, Functional transfer training  OT Frequency: 3 times per week  OT Discharge Recommendations: Moderate intensity level of continued care  OT - OK to Discharge: Yes (to next level of care when cleared by medical staff.)    Subjective   Current Problem:  1. Declining functional status        2. Hyponatremia        3. Altered mental status, unspecified altered mental status type        4. Edema, unspecified type  Lower extremity venous duplex bilateral    Lower extremity venous duplex bilateral      5. Localized swelling, mass and lump, left lower limb  Lower extremity venous duplex bilateral    Lower extremity venous duplex bilateral      6. Localized edema  Lower extremity venous duplex bilateral        General:  General  Reason for Referral: Altered mental status  Referred By: Octavia  Past Medical History Relevant to Rehab: CVA with R tres, aphasia, cellulitis, seizures  Family/Caregiver Present: Yes  Caregiver Feedback: brother, appears supportive  Co-Treatment: PT  Prior to Session Communication: Bedside nurse  Patient Position Received: Bed, 4 rail up, Alarm on  Preferred Learning Style: verbal  Precautions:  Medical Precautions: Fall precautions  Vital Signs:     Pain:  Pain Assessment  Pain Score: 0 - No pain    Objective   Cognition:  Orientation Level: Disoriented to place, Disoriented to time, Disoriented to situation           Home Living:  Type  of Home: Assisted living  Lives With: Alone (Lives alone, [facility])   Prior Function:  Prior Function Comments: Per brothers report, pta was receiving assist with adls and transfers, sleeps in a lift chair, has a transport chair, has not ambulated in two months  IADL History:     ADL:  Eating Assistance: Maximal  Grooming Assistance: Maximal  Bathing Assistance: Maximal  UE Dressing Assistance: Maximal  LE Dressing Assistance: Maximal  Activity Tolerance:  Endurance: Decreased tolerance for upright activites  Bed Mobility/Transfers: Bed Mobility  Bed Mobility: Yes (Supine to sit eob MAX X 2. Sit to stand MAX X 2 with gait belt and return to sitting. MAX X 2 sit to stand for bowel hygiene and linen replacement then sit eob. MAX X 2 sit to supine in bed and reposition for comfort. Bed check on.)    Sensation:  Light Touch: No apparent deficits  Strength:  Strength Comments: R UE 2-/5 overall, mild tone throughout. L UE 3+/5 overall  Perception:     Coordination:  Movements are Fluid and Coordinated: No  Upper Body Coordination: Increased tone R UE  Finger to Nose: Impaired (right hand, intact left hand)   Hand Function:  Hand Function  Gross Grasp: Impaired (right hand)  Coordination: Impaired (right hand)    Outcome Measures: [facility] Daily Activity  Putting on and taking off regular lower body clothing: A lot  Bathing (including washing, rinsing, drying): A lot  Putting on and taking off regular upper body clothing: A lot  Toileting, which includes using toilet, bedpan or urinal: A lot  Taking care of personal grooming such as brushing teeth: A lot  Eating Meals: A lot  Daily Activity - Total Score: 12         and    Education Documentation  No documentation found.  Education Comments  No comments found.      Goals:   Encounter Problems       Encounter Problems (Active)       OT Goals       Patient will complete UE adls with MOD A. (Progressing)       Start:  04/04/24    Expected End:  04/18/24             Patient will complete LE adls with MOD A, (Progressing)       Start:  04/04/24    Expected End:  04/18/24            Patient will complete all transfers with MOD A. (Progressing)       Start:  04/04/24    Expected End:  04/18/24            Patient will complete all mobility tasks with MOD A, (Progressing)       Start:  04/04/24    Expected End:  04/18/24

## 2024-04-04 NOTE — CARE PLAN
The patient's goals for the shift include    Problem: Pain  Goal: My pain/discomfort is manageable  Outcome: Progressing     Problem: Safety  Goal: Patient will be injury free during hospitalization  Outcome: Progressing  Goal: I will remain free of falls  Outcome: Progressing     Problem: Daily Care  Goal: Daily care needs are met  Outcome: Progressing     Problem: Psychosocial Needs  Goal: Demonstrates ability to cope with hospitalization/illness  Outcome: Progressing  Goal: Collaborate with me, my family, and caregiver to identify my specific goals  Outcome: Progressing     Problem: Discharge Barriers  Goal: My discharge needs are met  Outcome: Progressing     Problem: Skin  Goal: Participates in plan/prevention/treatment measures  Outcome: Progressing  Flowsheets (Taken 4/4/2024 1024)  Participates in plan/prevention/treatment measures:   Elevate heels   Increase activity/out of bed for meals  Goal: Prevent/manage excess moisture  Outcome: Progressing  Flowsheets (Taken 4/4/2024 1024)  Prevent/manage excess moisture:   Moisturize dry skin   Cleanse incontinence/protect with barrier cream  Goal: Prevent/minimize sheer/friction injuries  Outcome: Progressing  Flowsheets (Taken 4/4/2024 1024)  Prevent/minimize sheer/friction injuries:   HOB 30 degrees or less   Turn/reposition every 2 hours/use positioning/transfer devices   Use pull sheet  Goal: Promote/optimize nutrition  Outcome: Progressing  Flowsheets (Taken 4/4/2024 1024)  Promote/optimize nutrition:   Consume > 50% meals/supplements   Monitor/record intake including meals  Goal: Promote skin healing  Outcome: Progressing  Flowsheets (Taken 4/4/2024 1024)  Promote skin healing:   Assess skin/pad under line(s)/device(s)   Turn/reposition every 2 hours/use positioning/transfer devices     Problem: Fall/Injury  Goal: Not fall by end of shift  Outcome: Progressing  Goal: Be free from injury by end of the shift  Outcome: Progressing  Goal: Verbalize understanding  of personal risk factors for fall in the hospital  Outcome: Progressing  Goal: Verbalize understanding of risk factor reduction measures to prevent injury from fall in the home  Outcome: Progressing  Goal: Use assistive devices by end of the shift  Outcome: Progressing  Goal: Pace activities to prevent fatigue by end of the shift  Outcome: Progressing     Problem: Pain - Adult  Goal: Verbalizes/displays adequate comfort level or baseline comfort level  Outcome: Progressing     Problem: Safety - Adult  Goal: Free from fall injury  Outcome: Progressing     Problem: Discharge Planning  Goal: Discharge to home or other facility with appropriate resources  Outcome: Progressing     Problem: Chronic Conditions and Co-morbidities  Goal: Patient's chronic conditions and co-morbidity symptoms are monitored and maintained or improved  Outcome: Progressing     Problem: General Stroke  Goal: Establish a mutual long term goal with patient by discharge  Outcome: Progressing  Goal: Demonstrate improvement in neurological exam throughout the shift  Outcome: Progressing  Goal: Maintain BP within ordered limits throughout shift  Outcome: Progressing  Goal: Participate in treatment (ie., meds, therapy) throughout shift  Outcome: Progressing  Goal: No symptoms of aspiration throughout shift  Outcome: Progressing  Goal: No symptoms of hemorrhage throughout shift  Outcome: Progressing       The clinical goals for the shift include pt will be free of injury this shift

## 2024-04-04 NOTE — PROGRESS NOTES
Physical Therapy    Physical Therapy Evaluation    Patient Name: Dimas Ann  MRN: [id number]  Today's Date: 4/4/2024   Time Calculation  Start Time: 1000  Stop Time: 1020  Time Calculation (min): 20 min  3118    Assessment/Plan   PT Assessment: Pt demonstrates decreased strength, decreased endurance, decreased ROM, decreased activity tolerance, and impaired balance/mobility.  Pt appears below baseline level of function and based on current level of function, pt would benefit from continued skilled therapy while in the hospital to ensure safety, decrease risk of falls, and regains strength/mobility back to baseline.  Once stable enough for discharge, pt would benefit from moderate intensity therapy prior to returning home.     PT Assessment Results: Decreased strength, Decreased range of motion, Decreased endurance, Impaired balance, Decreased mobility, Decreased coordination, Decreased cognition, Impaired judgement  Rehab Prognosis: Good  Evaluation/Treatment Tolerance: Patient limited by fatigue, Patient tolerated treatment well  Medical Staff Made Aware: Yes  End of Session Communication: Bedside nurse  End of Session Patient Position: Bed, 3 rail up, Alarm on  IP OR SWING BED PT PLAN  Inpatient or Swing Bed: Inpatient  PT Plan  Treatment/Interventions: Bed mobility, Transfer training, Gait training, Balance training, Neuromuscular re-education, Strengthening, Endurance training, Range of motion, Therapeutic exercise, Therapeutic activity  PT Plan: Skilled PT  PT Frequency: 3 times per week  PT Discharge Recommendations: Moderate intensity level of continued care  Equipment Recommended upon Discharge: Wheeled walker, Wheelchair  PT Recommended Transfer Status: Assist x2 (Max A)  PT - OK to Discharge: Yes - To next level of care when cleared by medical team    Subjective     Current Problem:  Patient Active Problem List   Diagnosis    Declining functional status    Bradycardia    History of stroke with residual  deficit    Mild protein-calorie malnutrition (CMS/HCC)    Non-traumatic rhabdomyolysis    Thrombocytopenia (CMS/HCC)    Hypothermia due to exposure    Hyponatremia    Pleural effusion on left    Elevated AST (SGOT)    Generalized weakness    Hypothyroidism    Hypertension       General Visit Information:  Per EMR: pt presenting to [facility] on 4/2/2024 with pertinent medical history of seizures, 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  Patient recently transferred from [place] to [place], therefore staff was unable to tell EMS what his baseline is.  On eval, patient aphasic, however following commands on left side.  Patient unable to provide information on HPI, history, or ROS.     In the ED, vital signs with temp 36.2, HR 85, RR 20, /70, SpO2 94% on room air.  CBC unremarkable.  CMP with sodium 128> 132, chloride 94, BUN 24, AST 44.  BNP WNL at 26.  High-sensitivity troponin WNL at 3.  UA with 4 urobilinogen, 5 ketones, 100 protein.  Chest x-ray moderate size left pleural effusion with associated left basilar atelectasis.  CT head no acute intracranial bleed or focal mass effect.  Changes from large old infarct in the left MCA distribution.  No meds given in the ED.  Patient admitted with declining functional status, hyponatremia, elevated AST, left pleural effusion, and generalized weakness with consult to case management, PT, OT, and speech.    General: On arrival, pt supine in bed.  Pt in no apparent distress and agreeable to therapy.    Reason for Referral: impaired mobility  Referred By: Zhen Dudley  Past Medical History Relevant to Rehab: CVA with R tres, aphasia, cellulitis, seizures, HTN  Family/Caregiver Present: Yes  Caregiver Feedback:  brother at bedside  Co-Treatment: OT  Prior to Session Communication: Bedside nurse  Patient Position Received: Bed, 3 rail up, Alarm on    Home Living/PLOF:  Pt unable to provide history, info obtained from brother at bedside.  Pt most recently from [facility] where he has only been for ~1 wk.  Pt's brother states that he sleeps in a lift chair and has x1-2 assist to transfer to transport chair.  Pt's brother states they also have a power WC.  He states that pt has not ambulated in ~2 months.  He states that pt was using SPC for ambulation ~2 months ago.     Precautions:  Precautions  Medical Precautions: Fall precautions, Seizure precautions    Objective     Pain:  Pain Assessment  Pain Assessment: 0-10  Pain Score: 0 - No pain    Cognition:  Cognition  Overall Cognitive Status:  (unable to assess secondary to pt baving limited communication)    General Assessments:      Activity Tolerance  Endurance: Decreased tolerance for upright activites    Coordination  Upper Body Coordination: increased tone RLE  Postural Control  Posture Comment: pt kyphotic and unable to sit up straight at EOB  Static Sitting Balance  Static Sitting-Comment/Number of Minutes: fair  Dynamic Sitting Balance  Dynamic Sitting-Comments: fair minus  Static Standing Balance  Static Standing-Comment/Number of Minutes: fair minus  Dynamic Standing Balance  Dynamic Standing-Comments: poor    Functional Assessments:  Bed Mobility   Supine to sit: Max A x2; pt able to move BLE towards EOB but required assist with completing transfer  Sit to supine: Max A x2  Boost towards HOB: Dep A x2    Transfers  Sit to stand: x2 trials secondary to pt required pericare; Max A x2; B knees blocked; pt with difficulty fully extending BLE for fully erect stance; pt stood for ~30 seconds   Stand to sit: Max A x2; decreased ecc control     Ambulation/Gait Training  Not attempted at this time secondary to safety    Extremity/Trunk Assessments:  LLE: pt able to wiggle  toes and pump ankles; Min SLR  RLE: Min hip flexion; 0/5 DF    Further strength testing not performed secondary to increased assist needed for sit balance     Outcome Measures:  [facility] Basic Mobility  Turning from your back to your side while in a flat bed without using bedrails: A lot  Moving from lying on your back to sitting on the side of a flat bed without using bedrails: A lot  Moving to and from bed to chair (including a wheelchair): A lot  Standing up from a chair using your arms (e.g. wheelchair or bedside chair): A lot  To walk in hospital room: Total  Climbing 3-5 steps with railing: Total  Basic Mobility - Total Score: 10    Goals:  Encounter Problems       Encounter Problems (Active)       PT Problem       Pt will be able to perform all bed mobility tasks with Mod A.  (Progressing)       Start:  04/04/24    Expected End:  04/18/24            Pt will perform all transfers with Mod A and LRAD with proper safety mechanics.   (Progressing)       Start:  04/04/24    Expected End:  04/18/24            Pt will ambulate 10 ft with Mod A using LRAD for improved functional independence.  (Progressing)       Start:  04/04/24    Expected End:  04/18/24            Pt will demonstrate good dynamic sit balance for completion of therapeutic exercises and functional tasks.  (Progressing)       Start:  04/04/24    Expected End:  04/18/24                 Education Documentation  Body Mechanics, taught by Luma Diaz PT at 4/4/2024 12:17 PM.  Learner: Patient  Readiness: Acceptance  Method: Explanation  Response: Verbalizes Understanding    Home Exercise Program, taught by Luma Diaz PT at 4/4/2024 12:17 PM.  Learner: Patient  Readiness: Acceptance  Method: Explanation  Response: Verbalizes Understanding    Mobility Training, taught by Luma Diaz PT at 4/4/2024 12:17 PM.  Learner: Patient  Readiness: Acceptance  Method: Explanation  Response: Verbalizes Understanding    Body Mechanics, taught by Luma ALVAREZ  Emily PT at 4/4/2024 12:17 PM.  Learner: Patient  Readiness: Acceptance  Method: Explanation  Response: Needs Reinforcement, Verbalizes Understanding    Home Exercise Program, taught by Luma Diaz PT at 4/4/2024 12:17 PM.  Learner: Patient  Readiness: Acceptance  Method: Explanation  Response: Needs Reinforcement, Verbalizes Understanding    Mobility Training, taught by Luma Diaz PT at 4/4/2024 12:17 PM.  Learner: Patient  Readiness: Acceptance  Method: Explanation  Response: Needs Reinforcement, Verbalizes Understanding    Education Comments  No comments found.

## 2024-04-04 NOTE — PROGRESS NOTES
Dimas Ann is a 69 y.o. male on day 2 of admission presenting with Declining functional status.    Subjective   No cp/SOB       Objective         Current Facility-Administered Medications:     acetaminophen (Tylenol) tablet 650 mg, 650 mg, oral, q6h PRN, ANNA Duarte, 650 mg at 04/03/24 2014    aspirin chewable tablet 81 mg, 81 mg, oral, Daily, ANNA Duarte, 81 mg at 04/04/24 0844    divalproex (Depakote) EC tablet 2,000 mg, 2,000 mg, oral, Nightly, ANNA Duarte, 2,000 mg at 04/03/24 2014    divalproex (Depakote) EC tablet 250 mg, 250 mg, oral, Nightly, ANNA Duarte, 250 mg at 04/03/24 2016    enoxaparin (Lovenox) syringe 40 mg, 40 mg, subcutaneous, Daily, ANNA Duarte, 40 mg at 04/04/24 0844    latanoprost (Xalatan) 0.005 % ophthalmic solution 1 drop, 1 drop, Both Eyes, Nightly, ANNA Duarte, 1 drop at 04/02/24 2048    levothyroxine (Synthroid, Levoxyl) tablet 50 mcg, 50 mcg, oral, Daily before breakfast, ANNA Duarte, 50 mcg at 04/03/24 0603    LORazepam (Ativan) injection 1 mg, 1 mg, intravenous, Once, Israel BARRINGTON Simon DO    magnesium hydroxide (Milk of Magnesia) 2,400 mg/10 mL suspension 30 mL, 30 mL, oral, Daily PRN, ANNA Duarte    melatonin tablet 3 mg, 3 mg, oral, Nightly PRN, ANNA Duarte, 3 mg at 04/03/24 2009    ondansetron (Zofran) injection 4 mg, 4 mg, intravenous, Once, Israel BARRINGTON Simon DO    polyethylene glycol (Glycolax, Miralax) packet 17 g, 17 g, oral, Daily PRN, ANNA Duarte    simvastatin (Zocor) tablet 20 mg, 20 mg, oral, Nightly, ANNA Duarte, 20 mg at 04/03/24 2014    tamsulosin (Flomax) 24 hr capsule 0.4 mg, 0.4 mg, oral, Nightly, ANNA Duarte, 0.4 mg at 04/03/24 2014       Physical Exam  HENT:      Head: Normocephalic.   Eyes:      Conjunctiva/sclera: Conjunctivae normal.   Cardiovascular:      Rate and Rhythm:  "Regular rhythm.   Pulmonary:      Breath sounds: Normal breath sounds.   Abdominal:      General: Bowel sounds are normal.      Palpations: Abdomen is soft.   Musculoskeletal:         General: Normal range of motion.   Skin:     General: Skin is warm and dry.   Neurological:      General: No focal deficit present.      Mental Status: He is alert.   Psychiatric:         Behavior: Behavior normal.           Last Recorded Vitals  Blood pressure 129/82, pulse 90, temperature 36 °C (96.8 °F), temperature source Temporal, resp. rate 18, height 1.778 m (5' 10\"), weight 83.9 kg (185 lb), SpO2 94 %.  Intake/Output last 3 Shifts:  I/O last 3 completed shifts:  In: 1757.9 (20.9 mL/kg) [P.O.:340; I.V.:1317.9 (15.7 mL/kg); IV Piggyback:100]  Out: 1300 (15.5 mL/kg) [Urine:1300 (0.4 mL/kg/hr)]  Weight: 83.9 kg     Labs:       Results for orders placed or performed during the hospital encounter of 04/02/24 (from the past 24 hour(s))   CBC   Result Value Ref Range    WBC 8.7 4.4 - 11.3 x10*3/uL    nRBC 0.0 0.0 - 0.0 /100 WBCs    RBC 3.96 (L) 4.50 - 5.90 x10*6/uL    Hemoglobin 13.0 (L) 13.5 - 17.5 g/dL    Hematocrit 38.5 (L) 41.0 - 52.0 %    MCV 97 80 - 100 fL    MCH 32.8 26.0 - 34.0 pg    MCHC 33.8 32.0 - 36.0 g/dL    RDW 13.1 11.5 - 14.5 %    Platelets 167 150 - 450 x10*3/uL   Basic metabolic panel   Result Value Ref Range    Glucose 107 (H) 74 - 99 mg/dL    Sodium 132 (L) 136 - 145 mmol/L    Potassium 3.7 3.5 - 5.3 mmol/L    Chloride 95 (L) 98 - 107 mmol/L    Bicarbonate 32 21 - 32 mmol/L    Anion Gap 9 (L) 10 - 20 mmol/L    Urea Nitrogen 24 (H) 6 - 23 mg/dL    Creatinine 0.53 0.50 - 1.30 mg/dL    eGFR >90 >60 mL/min/1.73m*2    Calcium 8.8 8.6 - 10.3 mg/dL   Magnesium   Result Value Ref Range    Magnesium 2.16 1.60 - 2.40 mg/dL      Radiology:    STUDY:  XR CHEST 1 VIEW;  4/4/2024 12:16 pm      INDICATION:  Signs/Symptoms:congestion.      COMPARISON:  04/02/2024      ACCESSION NUMBER(S):  [id number]      ORDERING " CLINICIAN:  LUPE GREGORIO      FINDINGS:  Small to moderate left basilar pleural effusion may be slightly  smaller. Overlying airspace opacities persists. Right costophrenic  angle is obscured. Cardiomediastinal silhouette unchanged. Pulmonary  vascular congestion persists.      IMPRESSION:  Pulmonary vascular congestion.  Left pleural effusion appears slightly smaller than 2 days ago.            Assessment/Plan   Principal Problem:    Declining functional status  Active Problems:    History of stroke with residual deficit    Hyponatremia    Pleural effusion on left    Elevated AST (SGOT)    Generalized weakness    Hypothyroidism    Hypertension          PLAN: pt is little more awake and alert, med list and labs reviewed, for now c/w current Rx, appreciate input of neurology, d/w CNP, follow closely, I ve coordinated the care.            Zhen Dudley MD

## 2024-04-04 NOTE — PROGRESS NOTES
Dimas Ann is a 69 y.o. male on day 2 of admission presenting with Declining functional status.      Subjective   Seen and examined at bedside.  No events overnight. Underwent MBS, only failed thin liquids with MBS, but when patient was given thick liquids he was coughing when swallowing.  Made NPO again.  Plan for MRI today if he tolerates it.        Objective     Last Recorded Vitals  /82 (BP Location: Left arm, Patient Position: Lying)   Pulse 90   Temp 36 °C (96.8 °F) (Temporal)   Resp 18   Wt 83.9 kg (185 lb)   SpO2 94%   Intake/Output last 3 Shifts:    Intake/Output Summary (Last 24 hours) at 4/4/2024 1333  Last data filed at 4/4/2024 0849  Gross per 24 hour   Intake 817.5 ml   Output 1300 ml   Net -482.5 ml       Admission Weight  Weight: 83.9 kg (185 lb) (04/02/24 0833)    Daily Weight  04/02/24 : 83.9 kg (185 lb)    Image Results  XR chest 1 view  Narrative: Interpreted By:  Kelvin Oconnell,   STUDY:  XR CHEST 1 VIEW;  4/4/2024 12:16 pm      INDICATION:  Signs/Symptoms:congestion.      COMPARISON:  04/02/2024      ACCESSION NUMBER(S):  [id number]      ORDERING CLINICIAN:  LUPE GREGORIO      FINDINGS:  Small to moderate left basilar pleural effusion may be slightly  smaller. Overlying airspace opacities persists. Right costophrenic  angle is obscured. Cardiomediastinal silhouette unchanged. Pulmonary  vascular congestion persists.      Impression: Pulmonary vascular congestion.  Left pleural effusion appears slightly smaller than 2 days ago.      MACRO:  None      Signed by: Kelvin Oconnell 4/4/2024 1:09 PM  Dictation workstation:   [id number]      Physical Exam  Constitutional: A&Ox0, frail, elderly gentleman NAD  Head and Face: Atraumatic, normocephalic   Eyes: Normal external exam, EOMI, PERRLA  Cardiovascular: RRR, S1/S2, no murmurs, rubs, or gallops, radial pulses +2  Pulmonary: CTAB, no respiratory distress, no wheezing, rales or rhonchi, on RA  Abdomen: +BS, soft, non-tender,  nondistended, no guarding rigidity or rebound tenderness, no masses noted  MSK: Negative for edema, No joint swelling, normal movements of all extremities.   Neuro: R sided hemiparesis RUE & RLE, LUE 3/5 strength, LLE 3/5 strength, dysarthria noted, A&Ox0, follows some commands  Skin- No lesions, contusions, or erythema.  Psychiatric: Judgment intact. Appropriate mood, affect and behavior   Relevant Results  Results for orders placed or performed during the hospital encounter of 04/02/24 (from the past 24 hour(s))   CBC   Result Value Ref Range    WBC 8.7 4.4 - 11.3 x10*3/uL    nRBC 0.0 0.0 - 0.0 /100 WBCs    RBC 3.96 (L) 4.50 - 5.90 x10*6/uL    Hemoglobin 13.0 (L) 13.5 - 17.5 g/dL    Hematocrit 38.5 (L) 41.0 - 52.0 %    MCV 97 80 - 100 fL    MCH 32.8 26.0 - 34.0 pg    MCHC 33.8 32.0 - 36.0 g/dL    RDW 13.1 11.5 - 14.5 %    Platelets 167 150 - 450 x10*3/uL   Basic metabolic panel   Result Value Ref Range    Glucose 107 (H) 74 - 99 mg/dL    Sodium 132 (L) 136 - 145 mmol/L    Potassium 3.7 3.5 - 5.3 mmol/L    Chloride 95 (L) 98 - 107 mmol/L    Bicarbonate 32 21 - 32 mmol/L    Anion Gap 9 (L) 10 - 20 mmol/L    Urea Nitrogen 24 (H) 6 - 23 mg/dL    Creatinine 0.53 0.50 - 1.30 mg/dL    eGFR >90 >60 mL/min/1.73m*2    Calcium 8.8 8.6 - 10.3 mg/dL   Magnesium   Result Value Ref Range    Magnesium 2.16 1.60 - 2.40 mg/dL           Assessment/Plan                  Principal Problem:    Declining functional status  Active Problems:    History of stroke with residual deficit    Hyponatremia    Pleural effusion on left    Elevated AST (SGOT)    Generalized weakness    Hypothyroidism    Hypertension          Malnutrition Diagnosis Status: New  Malnutrition Diagnosis: Moderate malnutrition related to chronic disease or condition  As Evidenced by: moderate muscle wasting and moderate subcutaneous fat loss.  I agree with the dietitian's malnutrition diagnosis.    Hx of Epilepsy  Remote L MCA stroke  Residual R  hemiparesis  Encephalopathy of unknown origin     - B12 level elevated  - Routine EEG pending  - MRI brain W/W/O contrast    - Aspiration precautions     Discussed with Dr. Phillip Simon DO

## 2024-04-05 ENCOUNTER — APPOINTMENT (OUTPATIENT)
Dept: RADIOLOGY | Facility: HOSPITAL | Age: 69
DRG: 070 | End: 2024-04-05
Payer: MEDICARE

## 2024-04-05 LAB
ANION GAP SERPL CALC-SCNC: 13 MMOL/L (ref 10–20)
BUN SERPL-MCNC: 24 MG/DL (ref 6–23)
CALCIUM SERPL-MCNC: 8.7 MG/DL (ref 8.6–10.3)
CHLORIDE SERPL-SCNC: 96 MMOL/L (ref 98–107)
CO2 SERPL-SCNC: 29 MMOL/L (ref 21–32)
CREAT SERPL-MCNC: 0.57 MG/DL (ref 0.5–1.3)
EGFRCR SERPLBLD CKD-EPI 2021: >90 ML/MIN/1.73M*2
ERYTHROCYTE [DISTWIDTH] IN BLOOD BY AUTOMATED COUNT: 13.3 % (ref 11.5–14.5)
GLUCOSE BLD MANUAL STRIP-MCNC: 95 MG/DL (ref 74–99)
GLUCOSE SERPL-MCNC: 110 MG/DL (ref 74–99)
HCT VFR BLD AUTO: 38.1 % (ref 41–52)
HGB BLD-MCNC: 12.5 G/DL (ref 13.5–17.5)
MAGNESIUM SERPL-MCNC: 2.05 MG/DL (ref 1.6–2.4)
MCH RBC QN AUTO: 32.4 PG (ref 26–34)
MCHC RBC AUTO-ENTMCNC: 32.8 G/DL (ref 32–36)
MCV RBC AUTO: 99 FL (ref 80–100)
NRBC BLD-RTO: 0 /100 WBCS (ref 0–0)
PLATELET # BLD AUTO: 150 X10*3/UL (ref 150–450)
POTASSIUM SERPL-SCNC: 3.8 MMOL/L (ref 3.5–5.3)
RBC # BLD AUTO: 3.86 X10*6/UL (ref 4.5–5.9)
SODIUM SERPL-SCNC: 134 MMOL/L (ref 136–145)
WBC # BLD AUTO: 7.9 X10*3/UL (ref 4.4–11.3)

## 2024-04-05 PROCEDURE — 85027 COMPLETE CBC AUTOMATED: CPT | Performed by: NURSE PRACTITIONER

## 2024-04-05 PROCEDURE — 74018 RADEX ABDOMEN 1 VIEW: CPT | Performed by: RADIOLOGY

## 2024-04-05 PROCEDURE — 36415 COLL VENOUS BLD VENIPUNCTURE: CPT | Performed by: NURSE PRACTITIONER

## 2024-04-05 PROCEDURE — 2500000002 HC RX 250 W HCPCS SELF ADMINISTERED DRUGS (ALT 637 FOR MEDICARE OP, ALT 636 FOR OP/ED): Performed by: NURSE PRACTITIONER

## 2024-04-05 PROCEDURE — 71045 X-RAY EXAM CHEST 1 VIEW: CPT | Performed by: RADIOLOGY

## 2024-04-05 PROCEDURE — 80048 BASIC METABOLIC PNL TOTAL CA: CPT | Performed by: NURSE PRACTITIONER

## 2024-04-05 PROCEDURE — 99232 SBSQ HOSP IP/OBS MODERATE 35: CPT

## 2024-04-05 PROCEDURE — 74018 RADEX ABDOMEN 1 VIEW: CPT

## 2024-04-05 PROCEDURE — 2500000001 HC RX 250 WO HCPCS SELF ADMINISTERED DRUGS (ALT 637 FOR MEDICARE OP): Performed by: NURSE PRACTITIONER

## 2024-04-05 PROCEDURE — 2500000004 HC RX 250 GENERAL PHARMACY W/ HCPCS (ALT 636 FOR OP/ED): Performed by: NURSE PRACTITIONER

## 2024-04-05 PROCEDURE — 82947 ASSAY GLUCOSE BLOOD QUANT: CPT

## 2024-04-05 PROCEDURE — 71045 X-RAY EXAM CHEST 1 VIEW: CPT

## 2024-04-05 PROCEDURE — 92526 ORAL FUNCTION THERAPY: CPT | Mod: GN | Performed by: SPEECH-LANGUAGE PATHOLOGIST

## 2024-04-05 PROCEDURE — 83735 ASSAY OF MAGNESIUM: CPT | Performed by: NURSE PRACTITIONER

## 2024-04-05 PROCEDURE — 1100000001 HC PRIVATE ROOM DAILY

## 2024-04-05 RX ADMIN — ENOXAPARIN SODIUM 40 MG: 40 INJECTION SUBCUTANEOUS at 08:06

## 2024-04-05 RX ADMIN — TAMSULOSIN HYDROCHLORIDE 0.4 MG: 0.4 CAPSULE ORAL at 20:18

## 2024-04-05 RX ADMIN — DIVALPROEX SODIUM 2000 MG: 500 TABLET, DELAYED RELEASE ORAL at 20:18

## 2024-04-05 RX ADMIN — DIVALPROEX SODIUM 250 MG: 500 TABLET, DELAYED RELEASE ORAL at 20:19

## 2024-04-05 RX ADMIN — LEVOTHYROXINE SODIUM 50 MCG: 50 TABLET ORAL at 06:01

## 2024-04-05 RX ADMIN — SIMVASTATIN 20 MG: 20 TABLET, FILM COATED ORAL at 20:18

## 2024-04-05 RX ADMIN — ASPIRIN 81 MG 81 MG: 81 TABLET ORAL at 08:06

## 2024-04-05 ASSESSMENT — COGNITIVE AND FUNCTIONAL STATUS - GENERAL
CLIMB 3 TO 5 STEPS WITH RAILING: TOTAL
DRESSING REGULAR UPPER BODY CLOTHING: TOTAL
MOBILITY SCORE: 8
DRESSING REGULAR LOWER BODY CLOTHING: TOTAL
DRESSING REGULAR LOWER BODY CLOTHING: TOTAL
MOBILITY SCORE: 8
EATING MEALS: TOTAL
PERSONAL GROOMING: TOTAL
DRESSING REGULAR UPPER BODY CLOTHING: TOTAL
DAILY ACTIVITIY SCORE: 6
WALKING IN HOSPITAL ROOM: TOTAL
TOILETING: TOTAL
MOVING TO AND FROM BED TO CHAIR: TOTAL
HELP NEEDED FOR BATHING: TOTAL
HELP NEEDED FOR BATHING: TOTAL
MOVING FROM LYING ON BACK TO SITTING ON SIDE OF FLAT BED WITH BEDRAILS: A LOT
PERSONAL GROOMING: TOTAL
MOVING TO AND FROM BED TO CHAIR: TOTAL
EATING MEALS: TOTAL
TOILETING: TOTAL
TURNING FROM BACK TO SIDE WHILE IN FLAT BAD: A LOT
STANDING UP FROM CHAIR USING ARMS: TOTAL
MOVING FROM LYING ON BACK TO SITTING ON SIDE OF FLAT BED WITH BEDRAILS: A LOT
MOBILITY SCORE: 8
DRESSING REGULAR LOWER BODY CLOTHING: TOTAL
DAILY ACTIVITIY SCORE: 6
WALKING IN HOSPITAL ROOM: TOTAL
DAILY ACTIVITIY SCORE: 6
EATING MEALS: TOTAL
TOILETING: TOTAL
CLIMB 3 TO 5 STEPS WITH RAILING: TOTAL
CLIMB 3 TO 5 STEPS WITH RAILING: TOTAL
STANDING UP FROM CHAIR USING ARMS: TOTAL
DRESSING REGULAR UPPER BODY CLOTHING: TOTAL
TURNING FROM BACK TO SIDE WHILE IN FLAT BAD: A LOT
TURNING FROM BACK TO SIDE WHILE IN FLAT BAD: A LOT
MOVING FROM LYING ON BACK TO SITTING ON SIDE OF FLAT BED WITH BEDRAILS: A LOT
PERSONAL GROOMING: TOTAL
MOVING TO AND FROM BED TO CHAIR: TOTAL
STANDING UP FROM CHAIR USING ARMS: TOTAL
HELP NEEDED FOR BATHING: TOTAL
WALKING IN HOSPITAL ROOM: TOTAL

## 2024-04-05 ASSESSMENT — PAIN - FUNCTIONAL ASSESSMENT: PAIN_FUNCTIONAL_ASSESSMENT: 0-10

## 2024-04-05 ASSESSMENT — PAIN SCALES - GENERAL
PAINLEVEL_OUTOF10: 0 - NO PAIN
PAINLEVEL_OUTOF10: 0 - NO PAIN

## 2024-04-05 NOTE — PROGRESS NOTES
"Speech-Language Pathology    SLP Adult Inpatient  Speech-Language Pathology Treatment     Patient Name: Dimas Ann  MRN: [id number]  Today's Date: 2024  Time Calculation  Start Time: 1140  Stop Time: 1150  Time Calculation (min): 10 min         Current Problem:   1. Declining functional status  EEG      2. Hyponatremia        3. Altered mental status, unspecified altered mental status type        4. Edema, unspecified type  Lower extremity venous duplex bilateral    Lower extremity venous duplex bilateral      5. Localized swelling, mass and lump, left lower limb  Lower extremity venous duplex bilateral    Lower extremity venous duplex bilateral      6. Localized edema  Lower extremity venous duplex bilateral            SLP Assessment:  SLP TX Intervention Outcome: Decline in Function  SLP Assessment Results: Other (Comment) (oropharyngeal dysphagia)  Prognosis: Fair  Treatment Provided: Yes.  Patient seen this date for dysphagia follow-up. Patient originally seen for MBSS on Wed 4/3, and although he only aspirated thin liquid, he is a high aspiration risk with any PO intake. He was recommended a modified diet (soft and bite-sized solids and mildly/nectar thick liquids by spoon only), but there was concern yesterday for new O2 requirement and increased congestion /\"gurgling\", so patient was made NPO pending re-assessment. Today he continued to sound very congested and \"gurgly\", with decreased alertness (RN reported he had been up throughout the night). Patient did participate in oral care with toothbrush/toothpaste, and was able to rinse and expectorate water, but demonstrated declining level of alertness during subsequent trials of puree and mildly thick liquid via spoon.   As patient remains NPO with no source of nutrition at this time, options discussed with medical team includin. Re-starting the modified PO diet when he is alert only, and strictly monitoring respiratory status (making NPO if there are " further concerns), or 2. considering possible alternate means of nutrition, if patient/family agree (RN reports they may not, per patient's previously expressed wishes). Palliative Care may be helpful in assisting patient/family to consider risks vs benefits of ongoing PO intake. ST to continue to follow.  Treatment Tolerance: Patient limited by fatigue  Medical Staff Made Aware: Yes  Barriers: Cognition, Comorbidities  Education Provided: Yes       Plan:  Inpatient/Swing Bed or Outpatient: Inpatient  Treatment/Interventions: Other (Comment) (dysphagia management)  SLP TX Plan: Continue Plan of Care  SLP Plan: Skilled SLP  SLP Frequency: 3x per week  Duration: Current admission  SLP Discharge Recommendations: Continue skilled SLP services at the next level of care  Next Treatment Priority: Ongoing assessment, patient/family education  Discussed POC: Patient, Nursing, Other (Comment) (NICOLLE Perea)  Discussed Risks/Benefits: Yes, Nursing, Other (Comment) (NICOLLE Perea)  Patient/Caregiver Agreeable: Other (Comment) (patient unable to state)      Subjective   Patient much more lethargic this date, was most alert/responsive during oral care and then became progressively less responsive.    Most Recent Visit:  SLP Received On: 04/05/24    General Visit Information:   Reason for Referral: Swallow evaluation  Referred By: SUSAN Duarte-CNP  Past Medical History Relevant to Rehab: seizures, left MCA CVA with residual right-sided hemiplegia and aphasia, carotid artery dissection, HTN, thrombocytopenia, mild protein calorie malnutrition, and hypothyroidism  Patient Seen During This Visit: Yes  Caregiver Feedback: RN reported patient has been lethargic since he was up most of the night, but appeared to do OK wtih a small pill in applesauce.  Total Number of Visits : 4  Prior to Session Communication: Bedside nurse      Pain Assessment:   Pain Assessment: 0-10  Pain Score: 0 - No pain      Objective   GOALS (established  "4/3/24):  1. Patient will tolerate recommended PO diet absent of overt s/s of aspiration in 90% of observed therapeutic trials.  Status: Ongoing  Progress: Limited number of trials given this date due to patient's lethargy. Patient continues with increased O2 needs and audible \"gurgling\".    Previous:  -Patient tolerated therapeutic PO trials with SLP; however, there are concerns regarding patient's tolerance of previously recommended diet, including new O2 demand and increased congestion. Will continue to assess.     2. Patient will implement safe swallowing strategies to reduce risk of aspiration in 90% of trials given caregiver assistance/cueing as needed.  Status: Progressing  Progress: Patient implemented effortful swallows and double swallows during limited PO trials.    Previous:  -Patient implemented safe swallowing strategies given full caregiver assistance this date     3. Patient will complete recommended swallowing exercises (lingual resistance, effortful swallow, chin tuck against resistance, supraglottic swallow) for at least 30 repetitions during treatment session given minimal-moderate cues.  Status: Progressing  Progress: Patient completed effortful swallow x4 this date.    Previous:  -Patient completed effortful swallow x12 this date.     4. Patient will complete respiratory muscle strength training (RMST) at recommended frequency given minimal-moderate cues.  Status: Ongoing   Progress: Not targeted       Therapeutic Swallow:  Therapeutic Swallow Intervention : PO Trials, Caregiver Education, Pharyngeal Strengthening Techniques, Compensatory Strategies  Pharyngeal Strengthening Techniques: Effortful Swallow  Solid Diet Recommendations: NPO, Other (Comment) (with consideration for patient/family goals of care)  Liquid Diet Recommendations: NPO, Other (Comments) (with consideration for patient/family goals of care)  Swallow Comments: May have meds crushed in puree, if unable to provide via alternate " meals.      Inpatient:  Learner patient   Barriers to Learning Level of alertness barrier; cognitive limitations barrier; communication limitations barrier   Method demonstration; verbal   Education - Topic ST provided patient education regarding role of ST, purpose of assessment, clinical impressions, goals of treatment, and plan of care. Patient verbalized questionable full comprehension, consistent with cognitive status. Education will be reinforced. ST further coordinated with RN regarding recommendations and precautions per this assessment, with RN verbalizing understanding.      Outcome    Unable to state; needs review/reinforcement

## 2024-04-05 NOTE — CARE PLAN
The patient's goals for the shift include      The clinical goals for the shift include pt will be free of injury this shift

## 2024-04-05 NOTE — CONSULTS
"Reason for Consult  Goals of care discussion    History Of Present Illness  Dimas Ann is a 69 y.o. male presenting with altered mental status. Pt is from [facility] in [place].     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 (CMS/[facility]), Bradycardia (02/10/2022), Carotid artery dissection (CMS/[facility]), Cellulitis, Hemiplegia (CMS/[facility]), History of stroke with residual deficit (02/10/2022), Hypertension, Hypothermia due to exposure (02/10/2022), Hypothyroidism, Mild protein-calorie malnutrition (CMS/HCC) (02/10/2022), Non-traumatic rhabdomyolysis (02/10/2022), Seizure (CMS/[facility]), Stroke (CMS/[facility]), and Thrombocytopenia (CMS/[facility]) (02/10/2022).    Surgical History  He has no past surgical history on file.     Social History  He reports that he has never smoked. He has never used smokeless tobacco. He reports that he does not currently use alcohol. Drug use questions deferred to the physician.    Family History  No family history on file.     Allergies  Patient has no known allergies.    Review of Systems       Physical Exam       Last Recorded Vitals  Blood pressure 141/86, pulse 94, temperature 36 °C (96.8 °F), temperature source Temporal, resp. rate 17, height 1.778 m (5' 10\"), weight 83.9 kg (185 lb), SpO2 98 %.    Relevant Results       Assessment/Plan   Palliative care to discuss goals of care with patient and family.   No family available at this time. Called pts brother BRYAAN Anthony [phone] with call back number.  Spoke with Phillip OWEN and BRENDAN Ray plan is to place order for cor denis  placement and nutrition services. Order MRI under anesthesia due to him not being able to tolerate it without sedation and echo.  Per chart pt is a full code Will discuss pts code status once able to speak with his brother.   Palliative care will continue to follow        Alexus Schreiber RN    "

## 2024-04-05 NOTE — PROGRESS NOTES
Spiritual Care Visit    Clinical Encounter Type  Visited With: Patient, Health care provider  Routine Visit: Introduction  Continue Visiting: Yes    Restorationist Encounters  Restorationist Needs: Prayer         Sacramental Encounters  Communion: Patient is currently unable  Sacrament of Sick-Anointing: Anointed                             Taxonomy  Intended Effects: Establish rapport and connectedness, Caroline affirmation, Build relationship of care and support, Demonstrate caring and concern

## 2024-04-05 NOTE — PROGRESS NOTES
Dimas Ann is a 69 y.o. male on day 3 of admission presenting with Declining functional status.    Subjective   No cp/SOB        Objective         Current Facility-Administered Medications:     acetaminophen (Tylenol) tablet 650 mg, 650 mg, oral, q6h PRN, ANNA Duarte, 650 mg at 04/03/24 2014    aspirin chewable tablet 81 mg, 81 mg, oral, Daily, ANNA Duarte, 81 mg at 04/05/24 0806    divalproex (Depakote) EC tablet 2,000 mg, 2,000 mg, oral, Nightly, ANNA Duarte, 2,000 mg at 04/04/24 2016    divalproex (Depakote) EC tablet 250 mg, 250 mg, oral, Nightly, ANNA Duarte, 250 mg at 04/04/24 2018    enoxaparin (Lovenox) syringe 40 mg, 40 mg, subcutaneous, Daily, ANNA Duarte, 40 mg at 04/05/24 0806    latanoprost (Xalatan) 0.005 % ophthalmic solution 1 drop, 1 drop, Both Eyes, Nightly, ANNA Duarte, 1 drop at 04/04/24 2018    levothyroxine (Synthroid, Levoxyl) tablet 50 mcg, 50 mcg, oral, Daily before breakfast, ANNA Duarte, 50 mcg at 04/05/24 0601    magnesium hydroxide (Milk of Magnesia) 2,400 mg/10 mL suspension 30 mL, 30 mL, oral, Daily PRN, ANNA Duarte    melatonin tablet 3 mg, 3 mg, oral, Nightly PRN, ANNA Duarte, 3 mg at 04/04/24 2046    polyethylene glycol (Glycolax, Miralax) packet 17 g, 17 g, oral, Daily PRN, ANNA Duarte    simvastatin (Zocor) tablet 20 mg, 20 mg, oral, Nightly, ANNA Duarte, 20 mg at 04/04/24 2017    tamsulosin (Flomax) 24 hr capsule 0.4 mg, 0.4 mg, oral, Nightly, Chyna Reyes SUSAN-CNP, 0.4 mg at 04/04/24 2018       Physical Exam  HENT:      Head: Normocephalic.   Eyes:      Conjunctiva/sclera: Conjunctivae normal.   Cardiovascular:      Rate and Rhythm: Regular rhythm.   Pulmonary:      Breath sounds: Normal breath sounds.   Abdominal:      General: Bowel sounds are normal.      Palpations: Abdomen is soft.  "  Musculoskeletal:         General: Normal range of motion.   Skin:     General: Skin is warm and dry.   Neurological:      General: No focal deficit present.      Mental Status: He is alert.   Psychiatric:         Behavior: Behavior normal.           Last Recorded Vitals  Blood pressure 141/86, pulse 94, temperature 36 °C (96.8 °F), temperature source Temporal, resp. rate 17, height 1.778 m (5' 10\"), weight 83.9 kg (185 lb), SpO2 98 %.  Intake/Output last 3 Shifts:  I/O last 3 completed shifts:  In: 130 (1.5 mL/kg) [P.O.:130]  Out: 1750 (20.9 mL/kg) [Urine:1750 (0.6 mL/kg/hr)]  Weight: 83.9 kg     Labs:       Results for orders placed or performed during the hospital encounter of 04/02/24 (from the past 24 hour(s))   CBC   Result Value Ref Range    WBC 7.9 4.4 - 11.3 x10*3/uL    nRBC 0.0 0.0 - 0.0 /100 WBCs    RBC 3.86 (L) 4.50 - 5.90 x10*6/uL    Hemoglobin 12.5 (L) 13.5 - 17.5 g/dL    Hematocrit 38.1 (L) 41.0 - 52.0 %    MCV 99 80 - 100 fL    MCH 32.4 26.0 - 34.0 pg    MCHC 32.8 32.0 - 36.0 g/dL    RDW 13.3 11.5 - 14.5 %    Platelets 150 150 - 450 x10*3/uL   Basic metabolic panel   Result Value Ref Range    Glucose 110 (H) 74 - 99 mg/dL    Sodium 134 (L) 136 - 145 mmol/L    Potassium 3.8 3.5 - 5.3 mmol/L    Chloride 96 (L) 98 - 107 mmol/L    Bicarbonate 29 21 - 32 mmol/L    Anion Gap 13 10 - 20 mmol/L    Urea Nitrogen 24 (H) 6 - 23 mg/dL    Creatinine 0.57 0.50 - 1.30 mg/dL    eGFR >90 >60 mL/min/1.73m*2    Calcium 8.7 8.6 - 10.3 mg/dL   Magnesium   Result Value Ref Range    Magnesium 2.05 1.60 - 2.40 mg/dL              Assessment/Plan   Principal Problem:    Declining functional status  Active Problems:    History of stroke with residual deficit    Hyponatremia    Pleural effusion on left    Elevated AST (SGOT)    Generalized weakness    Hypothyroidism    Hypertension        PLAN: Pt is slowly improving, med list and labs reviewed for now c/w current Rx, c/w PT/OT eval and Rx, follow closely.          Zhen SCHAEFER" MD [place]

## 2024-04-05 NOTE — PROGRESS NOTES
04/05/24 0902   Discharge Planning   Patient expects to be discharged to: SNF   Does the patient need discharge transport arranged? Yes   RoundTrip coordination needed? Yes   Has discharge transport been arranged? No     Referral Foll;ow up:  1. [facility] can accept, 2. [facility] can accept, 3. [facility] needs additional information, 4. [facility]: reviewing.     04/05: Updated brother at the bedside with facility choices. He feels both [facility] and [facility] may be too far for them. He would like a referral to [facility]. Referral sent.     1300: Facility of choice is [facility] per brother. Updated facility with [facility]OC

## 2024-04-05 NOTE — CARE PLAN
The patient's goals for the shift include    Problem: Pain  Goal: My pain/discomfort is manageable  Outcome: Progressing     Problem: Safety  Goal: Patient will be injury free during hospitalization  Outcome: Progressing  Goal: I will remain free of falls  Outcome: Progressing     Problem: Daily Care  Goal: Daily care needs are met  Outcome: Progressing     Problem: Psychosocial Needs  Goal: Demonstrates ability to cope with hospitalization/illness  Outcome: Progressing  Goal: Collaborate with me, my family, and caregiver to identify my specific goals  Outcome: Progressing     Problem: Discharge Barriers  Goal: My discharge needs are met  Outcome: Progressing     Problem: Skin  Goal: Participates in plan/prevention/treatment measures  Outcome: Progressing  Flowsheets (Taken 4/5/2024 1020)  Participates in plan/prevention/treatment measures: Elevate heels  Goal: Prevent/manage excess moisture  Outcome: Progressing  Flowsheets (Taken 4/5/2024 1020)  Prevent/manage excess moisture:   Moisturize dry skin   Cleanse incontinence/protect with barrier cream  Goal: Prevent/minimize sheer/friction injuries  Outcome: Progressing  Flowsheets (Taken 4/5/2024 1020)  Prevent/minimize sheer/friction injuries:   Turn/reposition every 2 hours/use positioning/transfer devices   HOB 30 degrees or less   Increase activity/out of bed for meals  Goal: Promote/optimize nutrition  Outcome: Progressing  Flowsheets (Taken 4/5/2024 1020)  Promote/optimize nutrition:   Monitor/record intake including meals   Consume > 50% meals/supplements  Goal: Promote skin healing  Outcome: Progressing  Flowsheets (Taken 4/5/2024 1020)  Promote skin healing:   Turn/reposition every 2 hours/use positioning/transfer devices   Assess skin/pad under line(s)/device(s)     Problem: Fall/Injury  Goal: Not fall by end of shift  Outcome: Progressing  Goal: Be free from injury by end of the shift  Outcome: Progressing  Goal: Verbalize understanding of personal risk  factors for fall in the hospital  Outcome: Progressing  Goal: Verbalize understanding of risk factor reduction measures to prevent injury from fall in the home  Outcome: Progressing  Goal: Use assistive devices by end of the shift  Outcome: Progressing  Goal: Pace activities to prevent fatigue by end of the shift  Outcome: Progressing     Problem: Pain - Adult  Goal: Verbalizes/displays adequate comfort level or baseline comfort level  Outcome: Progressing     Problem: Safety - Adult  Goal: Free from fall injury  Outcome: Progressing     Problem: Discharge Planning  Goal: Discharge to home or other facility with appropriate resources  Outcome: Progressing     Problem: Chronic Conditions and Co-morbidities  Goal: Patient's chronic conditions and co-morbidity symptoms are monitored and maintained or improved  Outcome: Progressing     Problem: General Stroke  Goal: Establish a mutual long term goal with patient by discharge  Outcome: Progressing  Goal: Demonstrate improvement in neurological exam throughout the shift  Outcome: Progressing  Goal: Maintain BP within ordered limits throughout shift  Outcome: Progressing  Goal: Participate in treatment (ie., meds, therapy) throughout shift  Outcome: Progressing  Goal: No symptoms of aspiration throughout shift  Outcome: Progressing  Goal: No symptoms of hemorrhage throughout shift  Outcome: Progressing       The clinical goals for the shift include pt will be free of worsening neurological deficits this shift

## 2024-04-05 NOTE — NURSING NOTE
Purewick cannister is empty, and the pad under the pt is dry. Bladder scanned pt: 159 ml. Reached out to the person covering this patient tonight,

## 2024-04-05 NOTE — PROGRESS NOTES
Dimas Ann is a 69 y.o. male on day 3 of admission presenting with Declining functional status.      Subjective   No events over night.  Mentation at baseline. Still did not tolerate MRI with medication.       Objective     Last Recorded Vitals  /77 (BP Location: Left arm, Patient Position: Lying)   Pulse 84   Temp 36 °C (96.8 °F) (Temporal)   Resp 16   Wt 83.9 kg (185 lb)   SpO2 98%   Intake/Output last 3 Shifts:    Intake/Output Summary (Last 24 hours) at 4/5/2024 1653  Last data filed at 4/5/2024 1513  Gross per 24 hour   Intake 0 ml   Output 800 ml   Net -800 ml       Admission Weight  Weight: 83.9 kg (185 lb) (04/02/24 0833)    Daily Weight  04/02/24 : 83.9 kg (185 lb)    Image Results  EEG  IMPRESSION    This vEEG is indicative of mild diffuse encephalopathy and supports a left hemispheric structural lesion. No epileptiform signs are noted.    A full report will be scanned into the patient's chart at a later time.    This report has been interpreted and electronically signed by  XR chest 1 view  Narrative: Interpreted By:  Kelvin Oconnell,   STUDY:  XR CHEST 1 VIEW;  4/4/2024 12:16 pm      INDICATION:  Signs/Symptoms:congestion.      COMPARISON:  04/02/2024      ACCESSION NUMBER(S):  [id number]      ORDERING CLINICIAN:  LUPE GREGORIO      FINDINGS:  Small to moderate left basilar pleural effusion may be slightly  smaller. Overlying airspace opacities persists. Right costophrenic  angle is obscured. Cardiomediastinal silhouette unchanged. Pulmonary  vascular congestion persists.      Impression: Pulmonary vascular congestion.  Left pleural effusion appears slightly smaller than 2 days ago.      MACRO:  None      Signed by: Kelvin Oconnell 4/4/2024 1:09 PM  Dictation workstation:   [id number]      Physical Exam  Constitutional: A&Ox2, frail, elderly gentleman NAD  Head and Face: Atraumatic, normocephalic   Eyes: Normal external exam, EOMI, PERRLA  Cardiovascular: RRR, S1/S2, no murmurs, rubs, or  gallops, radial pulses +2  Pulmonary: CTAB, no respiratory distress, no wheezing, rales or rhonchi, on 2L NC  Abdomen: +BS, soft, non-tender, nondistended, no guarding rigidity or rebound tenderness, no masses noted  MSK: BLE 3+ pitting edema, No joint swelling, normal movements of all extremities.   Neuro: R sided hemiparesis RUE & RLE, LUE 3/5 strength, LLE 3/5 strength, dysarthria noted, A&Ox2, follows some commands  Skin- No lesions, contusions, or erythema.  Psychiatric: Judgment intact. Appropriate mood, affect and behavior   Relevant Results      Assessment/Plan          Principal Problem:    Declining functional status  Active Problems:    History of stroke with residual deficit    Hyponatremia    Pleural effusion on left    Elevated AST (SGOT)    Generalized weakness    Hypothyroidism    Hypertension          Malnutrition Diagnosis Status: New  Malnutrition Diagnosis: Moderate malnutrition related to chronic disease or condition  As Evidenced by: moderate muscle wasting and moderate subcutaneous fat loss.  I agree with the dietitian's malnutrition diagnosis.       Hx of Epilepsy  Remote L MCA stroke  Residual R hemiparesis  Encephalopathy of unknown origin     - Continue depakote  - Routine EEG pending  - MRI brain W/W/O contrast under anesthesia as he did not tolerate MRI on 2 different attempts  - Aspiration precautions   - Corepack placement and enteral nutrition  - Echocardiogram pending      Discussed with Dr. Phillip Simon DO

## 2024-04-06 ENCOUNTER — APPOINTMENT (OUTPATIENT)
Dept: RADIOLOGY | Facility: HOSPITAL | Age: 69
DRG: 070 | End: 2024-04-06
Payer: MEDICARE

## 2024-04-06 ENCOUNTER — APPOINTMENT (OUTPATIENT)
Dept: CARDIOLOGY | Facility: HOSPITAL | Age: 69
DRG: 070 | End: 2024-04-06
Payer: MEDICARE

## 2024-04-06 VITALS
HEIGHT: 70 IN | BODY MASS INDEX: 26.48 KG/M2 | OXYGEN SATURATION: 95 % | SYSTOLIC BLOOD PRESSURE: 130 MMHG | WEIGHT: 185 LBS | HEART RATE: 95 BPM | DIASTOLIC BLOOD PRESSURE: 62 MMHG | RESPIRATION RATE: 18 BRPM | TEMPERATURE: 98.4 F

## 2024-04-06 LAB
ANION GAP SERPL CALC-SCNC: 16 MMOL/L (ref 10–20)
BUN SERPL-MCNC: 24 MG/DL (ref 6–23)
CALCIUM SERPL-MCNC: 8.9 MG/DL (ref 8.6–10.3)
CHLORIDE SERPL-SCNC: 96 MMOL/L (ref 98–107)
CO2 SERPL-SCNC: 27 MMOL/L (ref 21–32)
CREAT SERPL-MCNC: 0.58 MG/DL (ref 0.5–1.3)
EGFRCR SERPLBLD CKD-EPI 2021: >90 ML/MIN/1.73M*2
ERYTHROCYTE [DISTWIDTH] IN BLOOD BY AUTOMATED COUNT: 13.7 % (ref 11.5–14.5)
GLUCOSE SERPL-MCNC: 108 MG/DL (ref 74–99)
HCT VFR BLD AUTO: 29.7 % (ref 41–52)
HGB BLD-MCNC: 9.4 G/DL (ref 13.5–17.5)
MAGNESIUM SERPL-MCNC: 2.02 MG/DL (ref 1.6–2.4)
MCH RBC QN AUTO: 31.3 PG (ref 26–34)
MCHC RBC AUTO-ENTMCNC: 31.6 G/DL (ref 32–36)
MCV RBC AUTO: 99 FL (ref 80–100)
NRBC BLD-RTO: 0 /100 WBCS (ref 0–0)
PLATELET # BLD AUTO: 251 X10*3/UL (ref 150–450)
POTASSIUM SERPL-SCNC: 4.1 MMOL/L (ref 3.5–5.3)
RBC # BLD AUTO: 3 X10*6/UL (ref 4.5–5.9)
SODIUM SERPL-SCNC: 135 MMOL/L (ref 136–145)
WBC # BLD AUTO: 11.4 X10*3/UL (ref 4.4–11.3)

## 2024-04-06 PROCEDURE — 36415 COLL VENOUS BLD VENIPUNCTURE: CPT | Performed by: NURSE PRACTITIONER

## 2024-04-06 PROCEDURE — 74018 RADEX ABDOMEN 1 VIEW: CPT

## 2024-04-06 PROCEDURE — 2500000004 HC RX 250 GENERAL PHARMACY W/ HCPCS (ALT 636 FOR OP/ED): Performed by: EMERGENCY MEDICINE

## 2024-04-06 PROCEDURE — 83735 ASSAY OF MAGNESIUM: CPT | Performed by: NURSE PRACTITIONER

## 2024-04-06 PROCEDURE — 85027 COMPLETE CBC AUTOMATED: CPT | Performed by: NURSE PRACTITIONER

## 2024-04-06 PROCEDURE — 80048 BASIC METABOLIC PNL TOTAL CA: CPT | Performed by: NURSE PRACTITIONER

## 2024-04-06 PROCEDURE — 2500000004 HC RX 250 GENERAL PHARMACY W/ HCPCS (ALT 636 FOR OP/ED): Performed by: NURSE PRACTITIONER

## 2024-04-06 PROCEDURE — 74018 RADEX ABDOMEN 1 VIEW: CPT | Performed by: RADIOLOGY

## 2024-04-06 PROCEDURE — 5A1221J PERFORMANCE OF CARDIAC OUTPUT, CONTINUOUS, AUTOMATED: ICD-10-PCS | Performed by: INTERNAL MEDICINE

## 2024-04-06 RX ORDER — IPRATROPIUM BROMIDE AND ALBUTEROL SULFATE 2.5; .5 MG/3ML; MG/3ML
3 SOLUTION RESPIRATORY (INHALATION) EVERY 2 HOUR PRN
Status: DISCONTINUED | OUTPATIENT
Start: 2024-04-06 | End: 2024-04-06 | Stop reason: HOSPADM

## 2024-04-06 RX ORDER — FUROSEMIDE 10 MG/ML
20 INJECTION INTRAMUSCULAR; INTRAVENOUS ONCE
Status: COMPLETED | OUTPATIENT
Start: 2024-04-06 | End: 2024-04-06

## 2024-04-06 RX ORDER — EPINEPHRINE 0.1 MG/ML
INJECTION INTRACARDIAC; INTRAVENOUS CODE/TRAUMA/SEDATION MEDICATION
Status: COMPLETED | OUTPATIENT
Start: 2024-04-06 | End: 2024-04-06

## 2024-04-06 RX ADMIN — EPINEPHRINE 1 MG: 0.1 INJECTION INTRAVENOUS at 07:37

## 2024-04-06 RX ADMIN — FUROSEMIDE 20 MG: 10 INJECTION, SOLUTION INTRAMUSCULAR; INTRAVENOUS at 01:38

## 2024-04-06 ASSESSMENT — PAIN SCALES - GENERAL: PAINLEVEL_OUTOF10: 0 - NO PAIN

## 2024-04-06 NOTE — PROGRESS NOTES
"Nutrition Follow Up Assessment:   Nutrition Assessment    Reason for Assessment: Dietitian discretion (Auto SLP on consult w/recomendation for NTL)    Patient is a 69 y.o. male presenting from [facility] for declining functional status. SLP on consult w/recommendation for bite sized foods and NTL. Neurology on consult.      4/6/24 Follow up note - nutrition reconsulted - chart reviewed and events noted.  Per neurology notation pt did not tolerate MRI on 2 different attempts.  Pt has been NPO since 4/4 after ST was reconsulted due to pt having increased congestion and difficulty with PO diet that was recommended from MBSS.   Palliative care following for GOC, noting plan is for corpak placement.    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), Bradycardia (02/10/2022), Carotid artery dissection (CMS/HCC), Cellulitis, Hemiplegia (CMS/HCC), History of stroke with residual deficit (02/10/2022), Hypertension, Hypothermia due to exposure (02/10/2022), Hypothyroidism, Mild protein-calorie malnutrition (CMS/HCC) (02/10/2022), Non-traumatic rhabdomyolysis (02/10/2022), Seizure (CMS/HCC), Stroke (CMS/HCC), and Thrombocytopenia (CMS/HCC) (02/10/2022).    Nutrition History:  Food and Nutrient History: Pt w/meals provided by [facility] facility. Unable to reach staff at facilty to verify diet PTA.  Vitamin/Herbal Supplement Use: unknown  Food Allergies/Intolerances:  None  GI Symptoms: None  Oral Problems: Chewing difficulty and Swallowing difficulty       Current Diet: NPO Diet Except: Other (specify); Additional Details: crushed meds in puree; Effective now    Anthropometrics:  Height: 177.8 cm (5' 10\")   Weight: 83.9 kg (185 lb)   BMI (Calculated): 26.54             Weight Change %:  Weight History / % Weight Change: per archives 2/17/22 180#, 2/10/24 170#  Significant Weight Loss: No    Pain Assessment: 0-10  Pain Score: 0 - No pain      Nutrition Significant Labs:  BMP Trend: "   Results from last 7 days   Lab Units 04/06/24  0603 04/05/24  0627 04/04/24  0725 04/03/24  0603   GLUCOSE mg/dL 108* 110* 107* 88   CALCIUM mg/dL 8.9 8.7 8.8 8.7   SODIUM mmol/L 135* 134* 132* 132*   POTASSIUM mmol/L 4.1 3.8 3.7 3.6   CO2 mmol/L 27 29 32 27   CHLORIDE mmol/L 96* 96* 95* 95*   BUN mg/dL 24* 24* 24* 26*   CREATININE mg/dL 0.58 0.57 0.53 0.56        Nutrition Specific Medications:  Scheduled medications  aspirin, 81 mg, oral, Daily  divalproex, 2,000 mg, oral, Nightly  divalproex, 250 mg, oral, Nightly  enoxaparin, 40 mg, subcutaneous, Daily  latanoprost, 1 drop, Both Eyes, Nightly  levothyroxine, 50 mcg, oral, Daily before breakfast  perflutren lipid microspheres, 0.5-10 mL of dilution, intravenous, Once in imaging  simvastatin, 20 mg, oral, Nightly  tamsulosin, 0.4 mg, oral, Nightly      Nutrition Focused Physical Exam Findings:  defer: below information is from initial assessment  Subcutaneous Fat Loss:   Orbital Fat Pads: Mild-Moderate (slight dark circles and slight hollowing)  Buccal Fat Pads: Mild-Moderate (flat cheeks, minimal bounce)  Muscle Wasting:  Temporalis: Mild-Moderate (slight depression)  Pectoralis (Clavicular Region): Mild-Moderate (some protrusion of clavicle)  Quadriceps: Well nourished (well developed, well rounded)  Gastrocnemius: Well nourished (well developed bulbous muscle)  Edema:  Edema: +2 mild  Edema Location: BLE  Physical Findings:  Skin:  (pale, warm, dry, .bruising)    I/O:   Last BM Date: 04/04/24; Stool Appearance: Soft (04/04/24 1400)     Estimated Needs:   Total Energy Estimated Needs (kCal):  (1700- 2000 (20-24kcal/kg))  Total Protein Estimated Needs (g):  ( (1.0-1.2g/kg))  Total Fluid Estimated Needs (mL):  (1mL/kcal)          Nutrition Diagnosis   Malnutrition Diagnosis  Patient has Malnutrition Diagnosis: Yes  Diagnosis Status: Ongoing  Malnutrition Diagnosis: Moderate malnutrition related to chronic disease or condition  As Evidenced by: moderate  muscle wasting and moderate subcutaneous fat loss.  Additional Assessment Information: 4/6 pt with new need for EN recs         Nutrition Interventions/Recommendations         Nutrition Prescription:  Individualized Nutrition Prescription Provided for : Once corpak in place, suggest the following enteral nutrition regimen with Jevity 1.5        Nutrition Interventions:   ENTERAL NUTRITION RECOMMENDATIONS:  Initiate Jevity 1.5 by corpak starting at 20ml/hr  Advance rate by 10ml/hr every 6-8hour until goal rate of 60ml is met  this will be continuous x 22hrs (holding one hour before and one hour after synthroid administration) and at goal rate will provide 1980kcal, 84g pro, and 1003ml.    Coordination of Nutrition Care by a Nutrition Professional  Collaboration and Referral of Nutrition Care: Collaboration by nutrition professional with other providers  Goal: GAY Winters             Nutrition Monitoring and Evaluation   Food/Nutrient Related History Monitoring  Monitoring and Evaluation Plan: Enteral and parenteral nutrition intake  Energy Intake: Estimated energy intake  Criteria: will meet 75% of estimated energy needs.  Enteral and Parenteral Nutrition Intake: Enteral nutrition intake  Criteria: Pt will tolerate goal rate within 24-48hrs    Body Composition/Growth/Weight History  Monitoring and Evaluation Plan: Weight change, Weight  Weight Change: Weight change intent  Criteria: reduce wt d/t edema    Biochemical Data, Medical Tests and Procedures  Monitoring and Evaluation Plan: Electrolyte/renal panel  Electrolyte and Renal Panel: Phosphorus, Magnesium, Chloride, Sodium, BUN  Criteria: Maintain within acceptable range            Time Spent/Follow-up Reminder:   Time Spent (min): 60 minutes  Last Date of Nutrition Visit: 04/06/24  Nutrition Follow-Up Needed?: 3-5 days  Follow up Comment: JHW - starting EN

## 2024-04-06 NOTE — NURSING NOTE
730 AM, patient noted to be michael (28) on the monitor. Patient found to have emesis coming from his mouth and without a pulse. Code blue was called and CPR started. NP contacted POA and we were told to stop life saving measures at 743 AM. NP contacted POA and notified of death. POA to come in shortly.

## 2024-04-06 NOTE — NURSING NOTE
Code was called and high quality CPR started, FRANC Reyes contacted brother Raj LEE, and were told to stop the CPR during the code patient was administered 1 mg/10mL of Epinephrine with 30 ml of NS flush.

## 2024-04-06 NOTE — SIGNIFICANT EVENT
CODE BLUE was called on this patient.  I arrived with the ICU attending was already initiating high-quality CPR.  Spoke with patient's brother Raj sánchez at [phone] and updated him on the situation.  He stated that he would not want his brother living on the tube and on life support.  He wants us to stop doing CPR and resuscitation.  ICU attending informed.  Primary service SHAQ also at bedside and made aware of brother's decision.

## 2024-04-06 NOTE — CARE PLAN
Problem: Pain  Goal: My pain/discomfort is manageable  Outcome: Progressing     Problem: Safety  Goal: Patient will be injury free during hospitalization  Outcome: Progressing  Goal: I will remain free of falls  Outcome: Progressing     Problem: Daily Care  Goal: Daily care needs are met  Outcome: Progressing     Problem: Psychosocial Needs  Goal: Demonstrates ability to cope with hospitalization/illness  Outcome: Progressing  Goal: Collaborate with me, my family, and caregiver to identify my specific goals  Outcome: Progressing   The patient's goals for the shift include      The clinical goals for the shift include pt will remain free of injury during  shift    Over the shift, the patient did make progress toward the following goals.

## 2024-04-06 NOTE — SIGNIFICANT EVENT
The critical care team and I responded to a code blue.  This is a late entry.    Upon our arrival, high quality CPR was in process and he was being ventilated via a BVM.    He had obvious vomitus around his mouth.    ACLS protocols were immediately initiated.  Please see code note.    1 mg of Epinephrine was given and the Sebastián device was used for chest compressions.    Family was already being contacted by Dr Arnold and house staff.   During our resuscitation efforts, Dr. Arnold came and informed us that the patient's brother directed us to stop our efforts.  This was just prior to a planned defibrillation attempt.    Therefore, we honored his brother's wishes, as they were very appropriate, and we stopped the resuscitation.  He was not defibrillated.     Fabio Soria, DO  Critical Care and Emergency Medicine

## 2024-04-08 NOTE — ADDENDUM NOTE
Encounter addended by: Diana Temple on: 4/8/2024 8:17 AM   Actions taken: Imaging Exam ended, Check Out activity completed, Charge Capture section accepted

## 2024-04-08 NOTE — DOCUMENTATION CLARIFICATION NOTE
"    PATIENT:               ELIA RANDLE  ACCT #:                  [id number]  MRN:                       [id number]  :                       1955  ADMIT DATE:       2024 8:33 AM  DISCH DATE:        2024 11:13 AM  RESPONDING PROVIDER #:        [id number]          PROVIDER RESPONSE TEXT:    Acute Hypoxemic Respiratory Failure    CDI QUERY TEXT:    Clarification    Instruction:    Based on your assessment of the patient and the clinical information, please provide the requested documentation by clicking on the appropriate radio button and enter any additional information if prompted.    Question: Is there a diagnosis indicative of the clinical information    When answering this query, please exercise your independent professional judgment. The fact that a question is being asked, does not imply that any particular answer is desired or expected.    The patient's clinical indicators include:  Clinical Information:  69 yr old male presenting due to AMS and generalized weakness.  Admitted for Encephalopathy of unknown origin and Hyponatremia.    Clinical Indicators:  CCM note  1113 AM states \"The critical care team and I responded to a code blue.\"  Also stating \"Upon our arrival, high quality CPR was in process and he was being ventilated via a BVM\", \"During our resuscitation efforts, Dr. Arnold came and informed us that the patient's brother directed us to stop our efforts\", and \" He was not defibrillated.\"    Nursing note  0839 AM stated \"NP contacted POA and we were told to stop life saving measures at 743 AM.\"  Patient .    Treatment: Code Blue w/CPR using Sebastián device, ventilated via BVM.  ACLS protocols.  No defibrillation, family decision to stop resuscitation efforts.    Risk Factors: Elderly male w/Pleural Effusion, HTN, Aspiration, Pulmonary Edema, Encephalopathy.  Options provided:  -- Acute Hypoxemic Respiratory Failure  -- Other - I will add my own diagnosis  -- Refer to Clinical " Documentation Reviewer    Query created by: Linda Garcia on 4/8/2024 7:38 AM      Electronically signed by:  SARAH BUCIO MD 4/8/2024 8:16 AM

## 2024-04-08 NOTE — DOCUMENTATION CLARIFICATION NOTE
"    PATIENT:               ELIA RANDLE  ACCT #:                  [id number]  MRN:                       [id number]  :                       1955  ADMIT DATE:       2024 8:33 AM  DISCH DATE:        2024 11:13 AM  RESPONDING PROVIDER #:        [id number]          PROVIDER RESPONSE TEXT:    Acute Pulmonary Edema    CDI QUERY TEXT:    [facility]_Abnormal Studies    Instruction:    Based on your assessment of the patient and the clinical information, please provide the requested documentation by clicking on the appropriate radio button and enter any additional information if prompted.    Question: Is there a diagnosis indicative of the imaging studies and clinical findings    When answering this query, please exercise your independent professional judgment. The fact that a question is being asked, does not imply that any particular answer is desired or expected.    The patient's clinical indicators include:  Clinical Information:  69 yr old male presenting due to AMS and generalized weakness.  Admitted for Encephalopathy of unknown origin in presence of Hyponatremia and Pleural Effusion.    Clinical Indicators:  SLP consult w/MBSS 4/3 with noted \"SILENT ASPIRATION\" with thin liquids -NPO.    VS : RR 18 w/pox dropping to 88 percent RA with placement of 2L O2, improved to 94 percent.    CXR  \"Pulmonary vascular congestion\", \"Left pleural effusion appears slightly smaller..\"    Patient given Lasix 20 mg IVP.    Treatment: CXR.  Lasix IVP x1.  Supplemental O2.  Pox.    Risk Factors: Elderly male w/hx of Stroke w/Dysphagia presenting w/AMS, Pleural Effusion now requiring supplemental O2.  Options provided:  -- Acute Pulmonary Edema  -- No evidence of Acute Pulmonary Edema  -- Other - I will add my own diagnosis  -- Refer to Clinical Documentation Reviewer    Query created by: Linda Garcia on 2024 11:49 AM      Electronically signed by:  DOUG COLLINS MD 2024 8:18 PM          "

## 2024-05-02 LAB
ATRIAL RATE: 87 BPM
P AXIS: 30 DEGREES
P OFFSET: 187 MS
P ONSET: 137 MS
PR INTERVAL: 164 MS
Q ONSET: 219 MS
QRS COUNT: 14 BEATS
QRS DURATION: 92 MS
QT INTERVAL: 350 MS
QTC CALCULATION(BAZETT): 421 MS
QTC FREDERICIA: 396 MS
R AXIS: 23 DEGREES
T AXIS: 85 DEGREES
T OFFSET: 394 MS
VENTRICULAR RATE: 87 BPM

## 2024-05-07 NOTE — DISCHARGE SUMMARY
Discharge Diagnosis  Declining functional status, hx  of CVA, Pleural effusion, lt, HTN, Hypothyroidism, Acute resp failure    Issues Requiring Follow-Up  Declining functional status, hx  of CVA, Pleural effusion, lt, HTN, Hypothyroidism.    Discharge Meds     Your medication list        ASK your doctor about these medications        Instructions Last Dose Given Next Dose Due   acetaminophen 325 mg tablet  Commonly known as: Tylenol           aspirin 81 mg chewable tablet           cholecalciferol 50 MCG (2000 UT) tablet  Commonly known as: Vitamin D-3           Depakote 250 mg EC tablet  Generic drug: divalproex           divalproex 500 mg EC tablet  Commonly known as: Depakote           Fish OiL 1,000 mg (120 mg-180 mg) capsule  Generic drug: omega 3-dha-epa-fish oil           ketoconazole 2 % cream  Commonly known as: NIZOral           latanoprost 0.005 % ophthalmic solution  Commonly known as: Xalatan           levothyroxine 50 mcg tablet  Commonly known as: Synthroid, Levoxyl           magnesium hydroxide 400 mg/5 mL suspension  Commonly known as: Milk of Magnesia           simvastatin 20 mg tablet  Commonly known as: Zocor           tamsulosin 0.4 mg 24 hr capsule  Commonly known as: Flomax                    Test Results Pending At Discharge  Pending Labs       No current pending labs.            Hospital Course    Pt is a 69 y.o. 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 Pt was noted to have moderate size pleural effusion. Pt developed acute respiratory failure, she succumbed to her illness and  on 24     Pertinent Physical Exam At Time of Discharge      Limited    Outpatient Follow-Up         Zhen Dudley MD